# Patient Record
Sex: MALE | Race: OTHER
[De-identification: names, ages, dates, MRNs, and addresses within clinical notes are randomized per-mention and may not be internally consistent; named-entity substitution may affect disease eponyms.]

---

## 2017-02-04 ENCOUNTER — HOSPITAL ENCOUNTER (EMERGENCY)
Dept: HOSPITAL 47 - EC | Age: 72
Discharge: HOME | End: 2017-02-04
Payer: MEDICARE

## 2017-02-04 VITALS
DIASTOLIC BLOOD PRESSURE: 78 MMHG | SYSTOLIC BLOOD PRESSURE: 154 MMHG | HEART RATE: 74 BPM | RESPIRATION RATE: 16 BRPM | TEMPERATURE: 98.1 F

## 2017-02-04 DIAGNOSIS — Z79.84: ICD-10-CM

## 2017-02-04 DIAGNOSIS — F17.200: ICD-10-CM

## 2017-02-04 DIAGNOSIS — E11.65: Primary | ICD-10-CM

## 2017-02-04 LAB
ALP SERPL-CCNC: 76 U/L (ref 38–126)
ALT SERPL-CCNC: 53 U/L (ref 21–72)
ANION GAP SERPL CALC-SCNC: 14 MMOL/L
AST SERPL-CCNC: 28 U/L (ref 17–59)
BASOPHILS # BLD AUTO: 0.1 K/UL (ref 0–0.2)
BASOPHILS NFR BLD AUTO: 1 %
BUN SERPL-SCNC: 13 MG/DL (ref 9–20)
CALCIUM SPEC-MCNC: 9.5 MG/DL (ref 8.4–10.2)
CH: 30.3
CHCM: 33.6
CHLORIDE SERPL-SCNC: 104 MMOL/L (ref 98–107)
CO2 SERPL-SCNC: 23 MMOL/L (ref 22–30)
EOSINOPHIL # BLD AUTO: 0.2 K/UL (ref 0–0.7)
EOSINOPHIL NFR BLD AUTO: 2 %
ERYTHROCYTE [DISTWIDTH] IN BLOOD BY AUTOMATED COUNT: 4.83 M/UL (ref 4.3–5.9)
ERYTHROCYTE [DISTWIDTH] IN BLOOD: 13.6 % (ref 11.5–15.5)
GLUCOSE BLD-MCNC: 137 MG/DL (ref 75–99)
GLUCOSE BLD-MCNC: 193 MG/DL (ref 75–99)
GLUCOSE SERPL-MCNC: 200 MG/DL (ref 74–99)
GLUCOSE UR QL: (no result)
HCT VFR BLD AUTO: 43.7 % (ref 39–53)
HDW: 2.45
HGB BLD-MCNC: 14.4 GM/DL (ref 13–17.5)
KETONES UR QL STRIP.AUTO: (no result)
LUC NFR BLD AUTO: 3 %
LYMPHOCYTES # SPEC AUTO: 2 K/UL (ref 1–4.8)
LYMPHOCYTES NFR SPEC AUTO: 26 %
MAGNESIUM SPEC-SCNC: 1.6 MG/DL (ref 1.6–2.3)
MCH RBC QN AUTO: 29.9 PG (ref 25–35)
MCHC RBC AUTO-ENTMCNC: 33.1 G/DL (ref 31–37)
MCV RBC AUTO: 90.5 FL (ref 80–100)
MONOCYTES # BLD AUTO: 0.5 K/UL (ref 0–1)
MONOCYTES NFR BLD AUTO: 6 %
NEUTROPHILS # BLD AUTO: 4.9 K/UL (ref 1.3–7.7)
NEUTROPHILS NFR BLD AUTO: 63 %
NON-AFRICAN AMERICAN GFR(MDRD): >60
PH UR: 6 [PH] (ref 5–8)
PHOSPHATE SERPL-MCNC: 2.7 MG/DL (ref 2.5–4.5)
POTASSIUM SERPL-SCNC: 3.7 MMOL/L (ref 3.5–5.1)
PROT SERPL-MCNC: 7.2 G/DL (ref 6.3–8.2)
SODIUM SERPL-SCNC: 141 MMOL/L (ref 137–145)
SP GR UR: 1.03 (ref 1–1.03)
UA BILLING (MACRO VS. MICRO): (no result)
UROBILINOGEN UR QL STRIP: <2 MG/DL (ref ?–2)
WBC # BLD AUTO: 0.26 10*3/UL
WBC # BLD AUTO: 7.8 K/UL (ref 3.8–10.6)
WBC (PEROX): 7.6

## 2017-02-04 PROCEDURE — 82009 KETONE BODYS QUAL: CPT

## 2017-02-04 PROCEDURE — 84100 ASSAY OF PHOSPHORUS: CPT

## 2017-02-04 PROCEDURE — 87086 URINE CULTURE/COLONY COUNT: CPT

## 2017-02-04 PROCEDURE — 99284 EMERGENCY DEPT VISIT MOD MDM: CPT

## 2017-02-04 PROCEDURE — 36415 COLL VENOUS BLD VENIPUNCTURE: CPT

## 2017-02-04 PROCEDURE — 96360 HYDRATION IV INFUSION INIT: CPT

## 2017-02-04 PROCEDURE — 80053 COMPREHEN METABOLIC PANEL: CPT

## 2017-02-04 PROCEDURE — 81003 URINALYSIS AUTO W/O SCOPE: CPT

## 2017-02-04 PROCEDURE — 83735 ASSAY OF MAGNESIUM: CPT

## 2017-02-04 PROCEDURE — 85025 COMPLETE CBC W/AUTO DIFF WBC: CPT
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
## 2017-02-05 LAB — GLUCOSE BLD-MCNC: 223 MG/DL (ref 75–99)

## 2017-05-07 ENCOUNTER — HOSPITAL ENCOUNTER (EMERGENCY)
Dept: HOSPITAL 47 - EC | Age: 72
Discharge: HOME | End: 2017-05-07
Payer: MEDICARE

## 2017-05-07 VITALS — DIASTOLIC BLOOD PRESSURE: 76 MMHG | HEART RATE: 95 BPM | RESPIRATION RATE: 18 BRPM | SYSTOLIC BLOOD PRESSURE: 123 MMHG

## 2017-05-07 VITALS — TEMPERATURE: 98.3 F

## 2017-05-07 DIAGNOSIS — R63.8: ICD-10-CM

## 2017-05-07 DIAGNOSIS — E11.9: ICD-10-CM

## 2017-05-07 DIAGNOSIS — R51: ICD-10-CM

## 2017-05-07 DIAGNOSIS — Z79.84: ICD-10-CM

## 2017-05-07 DIAGNOSIS — Z90.49: ICD-10-CM

## 2017-05-07 DIAGNOSIS — F17.200: ICD-10-CM

## 2017-05-07 DIAGNOSIS — R10.9: Primary | ICD-10-CM

## 2017-05-07 LAB
ALP SERPL-CCNC: 107 U/L (ref 38–126)
ALT SERPL-CCNC: 50 U/L (ref 21–72)
AMYLASE SERPL-CCNC: 55 U/L (ref 30–110)
ANION GAP SERPL CALC-SCNC: 12 MMOL/L
AST SERPL-CCNC: 37 U/L (ref 17–59)
BASOPHILS # BLD AUTO: 0.1 K/UL (ref 0–0.2)
BASOPHILS NFR BLD AUTO: 1 %
BUN SERPL-SCNC: 9 MG/DL (ref 9–20)
CALCIUM SPEC-MCNC: 9.1 MG/DL (ref 8.4–10.2)
CH: 30.8
CHCM: 34.6
CHLORIDE SERPL-SCNC: 107 MMOL/L (ref 98–107)
CO2 SERPL-SCNC: 27 MMOL/L (ref 22–30)
EOSINOPHIL # BLD AUTO: 0.2 K/UL (ref 0–0.7)
EOSINOPHIL NFR BLD AUTO: 2 %
ERYTHROCYTE [DISTWIDTH] IN BLOOD BY AUTOMATED COUNT: 4.8 M/UL (ref 4.3–5.9)
ERYTHROCYTE [DISTWIDTH] IN BLOOD: 14.1 % (ref 11.5–15.5)
GLUCOSE SERPL-MCNC: 132 MG/DL (ref 74–99)
GLUCOSE UR QL: (no result)
HCT VFR BLD AUTO: 43 % (ref 39–53)
HDW: 2.96
HGB BLD-MCNC: 14.7 GM/DL (ref 13–17.5)
KETONES UR QL STRIP.AUTO: (no result)
LUC NFR BLD AUTO: 3 %
LYMPHOCYTES # SPEC AUTO: 2.3 K/UL (ref 1–4.8)
LYMPHOCYTES NFR SPEC AUTO: 27 %
MCH RBC QN AUTO: 30.7 PG (ref 25–35)
MCHC RBC AUTO-ENTMCNC: 34.3 G/DL (ref 31–37)
MCV RBC AUTO: 89.5 FL (ref 80–100)
MONOCYTES # BLD AUTO: 0.6 K/UL (ref 0–1)
MONOCYTES NFR BLD AUTO: 7 %
NEUTROPHILS # BLD AUTO: 5.3 K/UL (ref 1.3–7.7)
NEUTROPHILS NFR BLD AUTO: 60 %
NON-AFRICAN AMERICAN GFR(MDRD): >60
PH UR: 8.5 [PH] (ref 5–8)
POTASSIUM SERPL-SCNC: 3.5 MMOL/L (ref 3.5–5.1)
PROT SERPL-MCNC: 7.9 G/DL (ref 6.3–8.2)
SODIUM SERPL-SCNC: 146 MMOL/L (ref 137–145)
SP GR UR: 1.02 (ref 1–1.03)
UA BILLING (MACRO VS. MICRO): (no result)
UROBILINOGEN UR QL STRIP: <2 MG/DL (ref ?–2)
WBC # BLD AUTO: 0.27 10*3/UL
WBC # BLD AUTO: 8.7 K/UL (ref 3.8–10.6)
WBC (PEROX): 8.58

## 2017-05-07 PROCEDURE — 82150 ASSAY OF AMYLASE: CPT

## 2017-05-07 PROCEDURE — 96372 THER/PROPH/DIAG INJ SC/IM: CPT

## 2017-05-07 PROCEDURE — 80053 COMPREHEN METABOLIC PANEL: CPT

## 2017-05-07 PROCEDURE — 74000: CPT

## 2017-05-07 PROCEDURE — 81003 URINALYSIS AUTO W/O SCOPE: CPT

## 2017-05-07 PROCEDURE — 83690 ASSAY OF LIPASE: CPT

## 2017-05-07 PROCEDURE — 96375 TX/PRO/DX INJ NEW DRUG ADDON: CPT

## 2017-05-07 PROCEDURE — 99285 EMERGENCY DEPT VISIT HI MDM: CPT

## 2017-05-07 PROCEDURE — 96374 THER/PROPH/DIAG INJ IV PUSH: CPT

## 2017-05-07 PROCEDURE — 85025 COMPLETE CBC W/AUTO DIFF WBC: CPT

## 2017-05-07 PROCEDURE — 36415 COLL VENOUS BLD VENIPUNCTURE: CPT

## 2017-05-07 PROCEDURE — 74177 CT ABD & PELVIS W/CONTRAST: CPT

## 2017-05-07 NOTE — XR
EXAMINATION TYPE: XR KUB

 

DATE OF EXAM: 5/7/2017 8:59 AM

 

COMPARISON: 11/23/2016

 

HISTORY: Upper abdominal pain for 2 days with associated anorexia

 

TECHNIQUE: Upright single abdominal radiograph was obtained in the KUB projection.

 

FINDINGS: Cholecystectomy clips reside within the right upper quadrant. Surgical sutures are seen jaden
ng the left hemidiaphragm. There is a mild rotational dextroconvex curvature of the thoracolumbar spi
ne. Bowel gas pattern is unremarkable and nonobstructive. No abnormal radiopaque densities are seen w
ithin the abdomen or pelvis.

 

IMPRESSION: 

1. Nonobstructive bowel gas pattern.

2. No radiopaque calculi within the abdomen or pelvis.

3. Postsurgical changes along the left hemidiaphragm and within the gallbladder fossa.

## 2017-05-07 NOTE — CT
EXAMINATION TYPE: CT abdomen pelvis w con

 

DATE OF EXAM: 5/7/2017 10:45 AM

 

COMPARISON: 11/23/2016

 

HISTORY: Headache and Pain

 

CT DLP: 797.00 mGycm

Automated exposure control for dose reduction was used.

 

TECHNIQUE:  Helical acquisition of images was performed from the lung bases through the pelvis.

 

CONTRAST: 

Performed with Oral Contrast and with IV Contrast, patient injected with 100 ml mL of Omnipaque 300.

 

FINDINGS: 

 

LUNG BASES: Left hemidiaphragm elevation is similar to the prior with sutures along the left hemidiap
hragm and left basilar atelectasis. Pleural parenchymal scarring is also noted on the right as well a
s minimal right basilar atelectasis.

 

LIVER/GB: Diffuse hypoattenuation of the hepatic parenchyma is noted compatible with underlying hepat
ic steatosis. This finding limits evaluation for underlying hepatic masses. No gross evidence of hepa
tic masses seen. Gallbladder is surgically absent.

 

PANCREAS: No pancreatic ductal dilatation. Pancreas is abnormal enhancement and morphology.

 

SPLEEN: No significant abnormality is seen.

 

ADRENALS: No significant abnormality is seen.

 

KIDNEYS: The kidneys enhance and excrete symmetrically.

 

FREE AIR:  No free air is visualized.

 

RETROPERITONEAL ADENOPATHY:  None visualized

 

REPRODUCTIVE ORGANS: [place] gland is heterogenous and enlarged measuring up to 5.3 cm in greatest tr
ansverse dimension.

 

URINARY BLADDER:  No significant abnormality is seen.

 

PELVIC ADENOPATHY:  None visualized.

 

OSSEOUS STRUCTURES:  No significant abnormality is seen.

 

BOWEL:  Scattered sigmoid diverticula are seen without pericolonic fat stranding. Bowel is nondilated
 and nonobstructed. Appendix is within normal limits of size.

 

OTHER: Abdominal aorta is of normal course and caliber with calcified and noncalcified atheromatous c
hanges of the abdominal aorta and its branches.

 

IMPRESSION: 

 

1. OVERALL UNCHANGED EXAM IN COMPARISON TO THE PRIOR WITH NO ACUTE INTRA-ABDOMINAL PATHOLOGY IDENTIFI
ED.

2. HEPATIC STEATOSIS.

3. SIGMOID DIVERTICULOSIS WITHOUT EVIDENCE OF DIVERTICULITIS.

4. POSTSURGICAL CHANGES ALONG THE LEFT HEMIDIAPHRAGM WITH LEFT HEMIDIAPHRAGM ELEVATION AND LEFT BASIL
AR ATELECTASIS.
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
## 2017-07-26 ENCOUNTER — HOSPITAL ENCOUNTER (EMERGENCY)
Dept: HOSPITAL 47 - EC | Age: 72
Discharge: HOME | End: 2017-07-26
Payer: MEDICARE

## 2017-07-26 VITALS — HEART RATE: 69 BPM

## 2017-07-26 VITALS — SYSTOLIC BLOOD PRESSURE: 119 MMHG | RESPIRATION RATE: 17 BRPM | DIASTOLIC BLOOD PRESSURE: 71 MMHG

## 2017-07-26 VITALS — TEMPERATURE: 98 F

## 2017-07-26 DIAGNOSIS — F17.200: ICD-10-CM

## 2017-07-26 DIAGNOSIS — R42: ICD-10-CM

## 2017-07-26 DIAGNOSIS — E11.9: ICD-10-CM

## 2017-07-26 DIAGNOSIS — R53.1: Primary | ICD-10-CM

## 2017-07-26 DIAGNOSIS — R53.83: ICD-10-CM

## 2017-07-26 DIAGNOSIS — R41.82: ICD-10-CM

## 2017-07-26 DIAGNOSIS — Z79.899: ICD-10-CM

## 2017-07-26 DIAGNOSIS — Z79.84: ICD-10-CM

## 2017-07-26 DIAGNOSIS — R51: ICD-10-CM

## 2017-07-26 LAB
ALP SERPL-CCNC: 95 U/L (ref 38–126)
ALT SERPL-CCNC: 28 U/L (ref 21–72)
ANION GAP SERPL CALC-SCNC: 12 MMOL/L
APTT BLD: 24 SEC (ref 22–30)
AST SERPL-CCNC: 25 U/L (ref 17–59)
BASOPHILS # BLD AUTO: 0.1 K/UL (ref 0–0.2)
BASOPHILS NFR BLD AUTO: 1 %
BUN SERPL-SCNC: 6 MG/DL (ref 9–20)
CALCIUM SPEC-MCNC: 9.6 MG/DL (ref 8.4–10.2)
CH: 29.8
CHCM: 33.6
CHLORIDE SERPL-SCNC: 103 MMOL/L (ref 98–107)
CK SERPL-CCNC: 128 U/L (ref 55–170)
CO2 SERPL-SCNC: 24 MMOL/L (ref 22–30)
EOSINOPHIL # BLD AUTO: 0.2 K/UL (ref 0–0.7)
EOSINOPHIL NFR BLD AUTO: 2 %
ERYTHROCYTE [DISTWIDTH] IN BLOOD BY AUTOMATED COUNT: 5.03 M/UL (ref 4.3–5.9)
ERYTHROCYTE [DISTWIDTH] IN BLOOD: 14.8 % (ref 11.5–15.5)
GLUCOSE BLD-MCNC: 111 MG/DL (ref 75–99)
GLUCOSE SERPL-MCNC: 101 MG/DL (ref 74–99)
HCT VFR BLD AUTO: 44.8 % (ref 39–53)
HDW: 2.51
HGB BLD-MCNC: 14.8 GM/DL (ref 13–17.5)
INR PPP: 0.9 (ref ?–1.2)
LUC NFR BLD AUTO: 2 %
LYMPHOCYTES # SPEC AUTO: 2.5 K/UL (ref 1–4.8)
LYMPHOCYTES NFR SPEC AUTO: 29 %
MAGNESIUM SPEC-SCNC: 1.8 MG/DL (ref 1.6–2.3)
MCH RBC QN AUTO: 29.5 PG (ref 25–35)
MCHC RBC AUTO-ENTMCNC: 33.1 G/DL (ref 31–37)
MCV RBC AUTO: 89.1 FL (ref 80–100)
MONOCYTES # BLD AUTO: 0.5 K/UL (ref 0–1)
MONOCYTES NFR BLD AUTO: 6 %
NEUTROPHILS # BLD AUTO: 5.2 K/UL (ref 1.3–7.7)
NEUTROPHILS NFR BLD AUTO: 61 %
NON-AFRICAN AMERICAN GFR(MDRD): >60
PHOSPHATE SERPL-MCNC: 2.8 MG/DL (ref 2.5–4.5)
POTASSIUM SERPL-SCNC: 4.3 MMOL/L (ref 3.5–5.1)
PROT SERPL-MCNC: 7.8 G/DL (ref 6.3–8.2)
PT BLD: 9.7 SEC (ref 9–12)
SODIUM SERPL-SCNC: 139 MMOL/L (ref 137–145)
TROPONIN I SERPL-MCNC: <0.012 NG/ML (ref 0–0.03)
WBC # BLD AUTO: 0.18 10*3/UL
WBC # BLD AUTO: 8.6 K/UL (ref 3.8–10.6)
WBC (PEROX): 8.22

## 2017-07-26 PROCEDURE — 84100 ASSAY OF PHOSPHORUS: CPT

## 2017-07-26 PROCEDURE — 99284 EMERGENCY DEPT VISIT MOD MDM: CPT

## 2017-07-26 PROCEDURE — 71020: CPT

## 2017-07-26 PROCEDURE — 84484 ASSAY OF TROPONIN QUANT: CPT

## 2017-07-26 PROCEDURE — 36415 COLL VENOUS BLD VENIPUNCTURE: CPT

## 2017-07-26 PROCEDURE — 96361 HYDRATE IV INFUSION ADD-ON: CPT

## 2017-07-26 PROCEDURE — 85610 PROTHROMBIN TIME: CPT

## 2017-07-26 PROCEDURE — 96374 THER/PROPH/DIAG INJ IV PUSH: CPT

## 2017-07-26 PROCEDURE — 80053 COMPREHEN METABOLIC PANEL: CPT

## 2017-07-26 PROCEDURE — 82553 CREATINE MB FRACTION: CPT

## 2017-07-26 PROCEDURE — 83735 ASSAY OF MAGNESIUM: CPT

## 2017-07-26 PROCEDURE — 93005 ELECTROCARDIOGRAM TRACING: CPT

## 2017-07-26 PROCEDURE — 82550 ASSAY OF CK (CPK): CPT

## 2017-07-26 PROCEDURE — 85025 COMPLETE CBC W/AUTO DIFF WBC: CPT

## 2017-07-26 PROCEDURE — 85730 THROMBOPLASTIN TIME PARTIAL: CPT

## 2017-07-26 PROCEDURE — 70450 CT HEAD/BRAIN W/O DYE: CPT

## 2017-07-26 NOTE — XR
EXAMINATION TYPE: XR chest 2V

 

DATE OF EXAM: 7/26/2017

 

COMPARISON: 11/23/2016

 

HISTORY: Shortness of breath

 

TECHNIQUE:  Frontal and lateral views of the chest are obtained.

 

FINDINGS:

 

Scattered senescent parenchymal changes noted. Hyperinflation compatible with COPD. 

 

No evidence for infiltrate. No evidence for atelectasis. Left basilar parenchymal scarring. Chronic p
leural thickening bilateral costophrenic angles.

 

Heart size is stable.

 

Mediastinal structures are stable and grossly unremarkable.

 

No evidence for hilar prominence.

 

Degenerative changes dorsal spine. 

 

IMPRESSION:

1. No evidence for acute pulmonary disease.

## 2017-07-26 NOTE — CT
EXAMINATION TYPE: CT brain wo con

 

DATE OF EXAM: 7/26/2017

 

COMPARISON: Previous study dated 8/14/2016

 

HISTORY: Dizziness, HA, weakness

 

CT DLP: 1121 mGycm

Automated exposure control for dose reduction was used.

 

FINDINGS: 

There are generalized changes of sulcal prominence and ventriculomegaly, compatible with mild atrophi
c change. There is diffuse periventricular white matter lucency, compatible with small vessel ischemi
c change. There is no acute focal lesion, mass effect or midline shift identified. I do not see evide
nce of intracranial blood.

 

There is a 1.8 cm retention cyst or polyp involving the inferior aspect of the right maxillary sinus.
 There is mild, chronic mucoperiosteal thickening involving the left maxillary sinus. The remaining p
aranasal sinuses are clear. Fluid in the attic of both middle ears has cleared.

 

IMPRESSION: 

1. NO ACUTE INTRACRANIAL ABNORMALITY.

2. ATROPHIC CHANGE.

3. CHRONIC WHITE MATTER ISCHEMIC CHANGE.

4. MUCOPERIOSTEAL DISEASE INVOLVING BOTH MAXILLARY SINUSES.
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
## 2017-10-17 ENCOUNTER — HOSPITAL ENCOUNTER (EMERGENCY)
Dept: HOSPITAL 47 - EC | Age: 72
Discharge: HOME | End: 2017-10-17
Payer: MEDICARE

## 2017-10-17 VITALS — SYSTOLIC BLOOD PRESSURE: 147 MMHG | DIASTOLIC BLOOD PRESSURE: 78 MMHG | HEART RATE: 66 BPM

## 2017-10-17 VITALS — RESPIRATION RATE: 18 BRPM | TEMPERATURE: 98.7 F

## 2017-10-17 DIAGNOSIS — R11.0: ICD-10-CM

## 2017-10-17 DIAGNOSIS — Z79.899: ICD-10-CM

## 2017-10-17 DIAGNOSIS — E11.649: ICD-10-CM

## 2017-10-17 DIAGNOSIS — R51: Primary | ICD-10-CM

## 2017-10-17 DIAGNOSIS — I10: ICD-10-CM

## 2017-10-17 DIAGNOSIS — F17.200: ICD-10-CM

## 2017-10-17 DIAGNOSIS — R10.816: ICD-10-CM

## 2017-10-17 DIAGNOSIS — Z79.84: ICD-10-CM

## 2017-10-17 LAB
ALP SERPL-CCNC: 70 U/L (ref 38–126)
ALT SERPL-CCNC: 24 U/L (ref 21–72)
AMYLASE SERPL-CCNC: 49 U/L (ref 30–110)
ANION GAP SERPL CALC-SCNC: 10 MMOL/L
AST SERPL-CCNC: 15 U/L (ref 17–59)
BASOPHILS # BLD AUTO: 0.1 K/UL (ref 0–0.2)
BASOPHILS NFR BLD AUTO: 1 %
BUN SERPL-SCNC: 18 MG/DL (ref 9–20)
CALCIUM SPEC-MCNC: 9.3 MG/DL (ref 8.4–10.2)
CH: 29.9
CHCM: 32.7
CHLORIDE SERPL-SCNC: 110 MMOL/L (ref 98–107)
CO2 SERPL-SCNC: 22 MMOL/L (ref 22–30)
EOSINOPHIL # BLD AUTO: 0.2 K/UL (ref 0–0.7)
EOSINOPHIL NFR BLD AUTO: 3 %
ERYTHROCYTE [DISTWIDTH] IN BLOOD BY AUTOMATED COUNT: 4.6 M/UL (ref 4.3–5.9)
ERYTHROCYTE [DISTWIDTH] IN BLOOD: 14.5 % (ref 11.5–15.5)
GLUCOSE BLD-MCNC: 74 MG/DL (ref 75–99)
GLUCOSE SERPL-MCNC: 80 MG/DL (ref 74–99)
HCT VFR BLD AUTO: 42.3 % (ref 39–53)
HDW: 2.56
HGB BLD-MCNC: 13.7 GM/DL (ref 13–17.5)
INR PPP: 1 (ref ?–1.2)
LUC NFR BLD AUTO: 3 %
LYMPHOCYTES # SPEC AUTO: 2.3 K/UL (ref 1–4.8)
LYMPHOCYTES NFR SPEC AUTO: 25 %
MCH RBC QN AUTO: 29.7 PG (ref 25–35)
MCHC RBC AUTO-ENTMCNC: 32.3 G/DL (ref 31–37)
MCV RBC AUTO: 92 FL (ref 80–100)
MONOCYTES # BLD AUTO: 0.7 K/UL (ref 0–1)
MONOCYTES NFR BLD AUTO: 7 %
NEUTROPHILS # BLD AUTO: 5.4 K/UL (ref 1.3–7.7)
NEUTROPHILS NFR BLD AUTO: 61 %
NON-AFRICAN AMERICAN GFR(MDRD): >60
PH UR: 6 [PH] (ref 5–8)
POTASSIUM SERPL-SCNC: 3.6 MMOL/L (ref 3.5–5.1)
PROT SERPL-MCNC: 7.2 G/DL (ref 6.3–8.2)
PT BLD: 10.4 SEC (ref 9–12)
SODIUM SERPL-SCNC: 142 MMOL/L (ref 137–145)
SP GR UR: 1.02 (ref 1–1.03)
UA BILLING (MACRO VS. MICRO): (no result)
UROBILINOGEN UR QL STRIP: <2 MG/DL (ref ?–2)
WBC # BLD AUTO: 0.27 10*3/UL
WBC # BLD AUTO: 8.9 K/UL (ref 3.8–10.6)
WBC (PEROX): 8.78

## 2017-10-17 PROCEDURE — 74000: CPT

## 2017-10-17 PROCEDURE — 36415 COLL VENOUS BLD VENIPUNCTURE: CPT

## 2017-10-17 PROCEDURE — 70450 CT HEAD/BRAIN W/O DYE: CPT

## 2017-10-17 PROCEDURE — 80053 COMPREHEN METABOLIC PANEL: CPT

## 2017-10-17 PROCEDURE — 82150 ASSAY OF AMYLASE: CPT

## 2017-10-17 PROCEDURE — 96361 HYDRATE IV INFUSION ADD-ON: CPT

## 2017-10-17 PROCEDURE — 83690 ASSAY OF LIPASE: CPT

## 2017-10-17 PROCEDURE — 96375 TX/PRO/DX INJ NEW DRUG ADDON: CPT

## 2017-10-17 PROCEDURE — 85025 COMPLETE CBC W/AUTO DIFF WBC: CPT

## 2017-10-17 PROCEDURE — 93005 ELECTROCARDIOGRAM TRACING: CPT

## 2017-10-17 PROCEDURE — 85610 PROTHROMBIN TIME: CPT

## 2017-10-17 PROCEDURE — 96374 THER/PROPH/DIAG INJ IV PUSH: CPT

## 2017-10-17 PROCEDURE — 81003 URINALYSIS AUTO W/O SCOPE: CPT

## 2017-10-17 PROCEDURE — 99285 EMERGENCY DEPT VISIT HI MDM: CPT

## 2017-10-17 PROCEDURE — 84484 ASSAY OF TROPONIN QUANT: CPT

## 2017-10-17 PROCEDURE — 71020: CPT

## 2017-10-17 NOTE — XR
EXAMINATION TYPE: XR chest 2V

 

DATE OF EXAM: 10/17/2017

 

COMPARISON: Prior chest x-ray 7/26/2017

 

HISTORY: Headache and dizziness, nausea, COPD

 

TECHNIQUE:  Frontal and lateral views of the chest are obtained.

 

FINDINGS:  Cardiac mediastinal silhouette, pulmonary vascularity and celia are stable. There is some e
levation of the left hemidiaphragm which is chronic. Patchy basilar density is noted. Interstitium is
 increased. There are overlying cardiac leads. There are areas of possible scarring present. Suspect 
there are calcified pleural plaques. Hyperinflation may be indicative of COPD.

 

IMPRESSION:  No acute cardiopulmonary process. Possible asbestos related disease.
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
## 2017-10-17 NOTE — CT
EXAMINATION TYPE: CT brain wo con

 

DATE OF EXAM: 10/17/2017

 

COMPARISON: Prior CT 7/26/2017

 

HISTORY: Headache and dizziness. History of diabetes.

 

CT DLP: 1177.00 mGycm

Automated exposure control for dose reduction was used. Helical acquisition through the brain

 

FINDINGS: 

Minimal inflammatory change noted in the paranasal sinuses. Cerebral vascular calcifications are pres
ent. There is no hemorrhage or hydrocephalus. Cortical atrophy is noted. Periventricular white matter
 shows patchy low attenuation.

 

IMPRESSION: 

STABLE EXAM, NO ACUTE ABNORMALITY. AGE-RELATED CHANGES OF ATROPHY AND PROBABLE CHRONIC SMALL VESSEL I
SCHEMIA. MILD SINUS DISEASE.

## 2017-10-17 NOTE — XR
Abdomen

 

HISTORY: Dizziness, nausea

 

Frontal view of the abdomen on 2 images correlated to prior abdomen 5/7/2017

 

Calcified pleural plaque noted. There are basilar scarring changes. Surgical clips present in the rig
ht upper quadrant. There is a mild spinal curvature. No evident bowel obstruction or pneumoperitoneum
. There are vascular calcifications.

 

IMPRESSION: Nonobstructive bowel gas pattern.

## 2017-12-22 ENCOUNTER — HOSPITAL ENCOUNTER (EMERGENCY)
Dept: HOSPITAL 47 - EC | Age: 72
Discharge: HOME | End: 2017-12-22
Payer: MEDICARE

## 2017-12-22 DIAGNOSIS — F17.200: ICD-10-CM

## 2017-12-22 DIAGNOSIS — L29.9: Primary | ICD-10-CM

## 2017-12-22 PROCEDURE — 99282 EMERGENCY DEPT VISIT SF MDM: CPT

## 2018-03-10 ENCOUNTER — HOSPITAL ENCOUNTER (EMERGENCY)
Dept: HOSPITAL 47 - EC | Age: 73
Discharge: HOME | End: 2018-03-10
Payer: MEDICARE

## 2018-03-10 VITALS — SYSTOLIC BLOOD PRESSURE: 142 MMHG | DIASTOLIC BLOOD PRESSURE: 80 MMHG | TEMPERATURE: 98.7 F

## 2018-03-10 VITALS — RESPIRATION RATE: 18 BRPM

## 2018-03-10 VITALS — HEART RATE: 70 BPM

## 2018-03-10 DIAGNOSIS — Z79.84: ICD-10-CM

## 2018-03-10 DIAGNOSIS — I10: ICD-10-CM

## 2018-03-10 DIAGNOSIS — Z79.899: ICD-10-CM

## 2018-03-10 DIAGNOSIS — R51: ICD-10-CM

## 2018-03-10 DIAGNOSIS — F17.200: ICD-10-CM

## 2018-03-10 DIAGNOSIS — E11.9: ICD-10-CM

## 2018-03-10 DIAGNOSIS — E03.9: Primary | ICD-10-CM

## 2018-03-10 LAB
ALBUMIN SERPL-MCNC: 4.5 G/DL (ref 3.5–5)
ALP SERPL-CCNC: 88 U/L (ref 38–126)
ALT SERPL-CCNC: 21 U/L (ref 21–72)
ANION GAP SERPL CALC-SCNC: 13 MMOL/L
AST SERPL-CCNC: 26 U/L (ref 17–59)
BASOPHILS # BLD AUTO: 0.1 K/UL (ref 0–0.2)
BASOPHILS NFR BLD AUTO: 1 %
BUN SERPL-SCNC: 12 MG/DL (ref 9–20)
CALCIUM SPEC-MCNC: 9.8 MG/DL (ref 8.4–10.2)
CHLORIDE SERPL-SCNC: 105 MMOL/L (ref 98–107)
CO2 SERPL-SCNC: 20 MMOL/L (ref 22–30)
EOSINOPHIL # BLD AUTO: 0.2 K/UL (ref 0–0.7)
EOSINOPHIL NFR BLD AUTO: 2 %
ERYTHROCYTE [DISTWIDTH] IN BLOOD BY AUTOMATED COUNT: 4.65 M/UL (ref 4.3–5.9)
ERYTHROCYTE [DISTWIDTH] IN BLOOD: 14.2 % (ref 11.5–15.5)
GLUCOSE SERPL-MCNC: 141 MG/DL (ref 74–99)
HCT VFR BLD AUTO: 40.3 % (ref 39–53)
HGB BLD-MCNC: 13.7 GM/DL (ref 13–17.5)
LYMPHOCYTES # SPEC AUTO: 2.3 K/UL (ref 1–4.8)
LYMPHOCYTES NFR SPEC AUTO: 27 %
MCH RBC QN AUTO: 29.6 PG (ref 25–35)
MCHC RBC AUTO-ENTMCNC: 34.1 G/DL (ref 31–37)
MCV RBC AUTO: 86.8 FL (ref 80–100)
MONOCYTES # BLD AUTO: 0.5 K/UL (ref 0–1)
MONOCYTES NFR BLD AUTO: 6 %
NEUTROPHILS # BLD AUTO: 5.2 K/UL (ref 1.3–7.7)
NEUTROPHILS NFR BLD AUTO: 61 %
PLATELET # BLD AUTO: 331 K/UL (ref 150–450)
POTASSIUM SERPL-SCNC: 4.5 MMOL/L (ref 3.5–5.1)
PROT SERPL-MCNC: 7.9 G/DL (ref 6.3–8.2)
SODIUM SERPL-SCNC: 138 MMOL/L (ref 137–145)
T4 FREE SERPL-MCNC: 1.19 NG/DL (ref 0.78–2.19)
WBC # BLD AUTO: 8.5 K/UL (ref 3.8–10.6)

## 2018-03-10 PROCEDURE — 70450 CT HEAD/BRAIN W/O DYE: CPT

## 2018-03-10 PROCEDURE — 84443 ASSAY THYROID STIM HORMONE: CPT

## 2018-03-10 PROCEDURE — 36415 COLL VENOUS BLD VENIPUNCTURE: CPT

## 2018-03-10 PROCEDURE — 96361 HYDRATE IV INFUSION ADD-ON: CPT

## 2018-03-10 PROCEDURE — 80053 COMPREHEN METABOLIC PANEL: CPT

## 2018-03-10 PROCEDURE — 84439 ASSAY OF FREE THYROXINE: CPT

## 2018-03-10 PROCEDURE — 85025 COMPLETE CBC W/AUTO DIFF WBC: CPT

## 2018-03-10 PROCEDURE — 96374 THER/PROPH/DIAG INJ IV PUSH: CPT

## 2018-03-10 PROCEDURE — 99284 EMERGENCY DEPT VISIT MOD MDM: CPT

## 2018-03-10 NOTE — CT
EXAMINATION TYPE: CT brain wo con

 

DATE OF EXAM: 3/10/2018

 

HISTORY: Headache

 

CT DLP: 1051.6 mGycm.  Automated Exposure Control for Dose Reduction was Utilized.

 

TECHNIQUE: CT scan of the head is performed without contrast.

 

COMPARISON: CT brain October 17, 2017.

 

FINDINGS:   There is no acute intracranial hemorrhage or midline shift identified. There is diffuse v
entricular and sulcal prominence consistent with diffuse age-related cerebral atrophy.  There is low-
attenuation in the periventricular white matter consistent with chronic small vessel ischemic change.
  The globes  remain intact bilaterally.  There is persistent mild mucosal thickening and small polyp
 formation in bilateral ethmoid sinuses. Stable. There is chronic opacity and sclerosis of bilateral 
mastoid air cells redemonstrated

 

IMPRESSION:  No acute intracranial hemorrhage or midline shift.  There is persistent mild diffuse age
-related cerebral atrophy and moderate chronic small vessel ischemic change redemonstrated. No signif
icant change from prior CT.
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
## 2018-04-10 ENCOUNTER — HOSPITAL ENCOUNTER (OUTPATIENT)
Dept: HOSPITAL 47 - LABWHC1 | Age: 73
Discharge: HOME | End: 2018-04-10
Attending: PHYSICIAN ASSISTANT
Payer: MEDICARE

## 2018-04-10 DIAGNOSIS — L29.9: Primary | ICD-10-CM

## 2018-04-10 LAB
ALBUMIN SERPL-MCNC: 4.5 G/DL (ref 3.5–5)
ALP SERPL-CCNC: 94 U/L (ref 38–126)
ALT SERPL-CCNC: 33 U/L (ref 21–72)
ANION GAP SERPL CALC-SCNC: 16 MMOL/L
AST SERPL-CCNC: 21 U/L (ref 17–59)
BUN SERPL-SCNC: 10 MG/DL (ref 9–20)
CALCIUM SPEC-MCNC: 9.6 MG/DL (ref 8.4–10.2)
CHLORIDE SERPL-SCNC: 106 MMOL/L (ref 98–107)
CO2 SERPL-SCNC: 24 MMOL/L (ref 22–30)
ERYTHROCYTE [DISTWIDTH] IN BLOOD BY AUTOMATED COUNT: 4.56 M/UL (ref 4.3–5.9)
ERYTHROCYTE [DISTWIDTH] IN BLOOD: 14.2 % (ref 11.5–15.5)
GLUCOSE SERPL-MCNC: 122 MG/DL (ref 74–99)
HCT VFR BLD AUTO: 40.4 % (ref 39–53)
HGB BLD-MCNC: 13.7 GM/DL (ref 13–17.5)
MCH RBC QN AUTO: 30 PG (ref 25–35)
MCHC RBC AUTO-ENTMCNC: 33.8 G/DL (ref 31–37)
MCV RBC AUTO: 88.7 FL (ref 80–100)
PLATELET # BLD AUTO: 315 K/UL (ref 150–450)
POTASSIUM SERPL-SCNC: 4 MMOL/L (ref 3.5–5.1)
PROT SERPL-MCNC: 7.6 G/DL (ref 6.3–8.2)
SODIUM SERPL-SCNC: 146 MMOL/L (ref 137–145)
WBC # BLD AUTO: 9.3 K/UL (ref 3.8–10.6)

## 2018-04-10 PROCEDURE — 85027 COMPLETE CBC AUTOMATED: CPT

## 2018-04-10 PROCEDURE — 80053 COMPREHEN METABOLIC PANEL: CPT

## 2018-04-10 PROCEDURE — 36415 COLL VENOUS BLD VENIPUNCTURE: CPT

## 2018-05-15 ENCOUNTER — HOSPITAL ENCOUNTER (INPATIENT)
Dept: HOSPITAL 47 - EC | Age: 73
LOS: 5 days | Discharge: HOME | DRG: 177 | End: 2018-05-20
Payer: MEDICARE

## 2018-05-15 DIAGNOSIS — Z83.3: ICD-10-CM

## 2018-05-15 DIAGNOSIS — E11.65: ICD-10-CM

## 2018-05-15 DIAGNOSIS — Z79.84: ICD-10-CM

## 2018-05-15 DIAGNOSIS — T38.0X5A: ICD-10-CM

## 2018-05-15 DIAGNOSIS — J44.1: ICD-10-CM

## 2018-05-15 DIAGNOSIS — M47.9: ICD-10-CM

## 2018-05-15 DIAGNOSIS — E03.9: ICD-10-CM

## 2018-05-15 DIAGNOSIS — I11.9: ICD-10-CM

## 2018-05-15 DIAGNOSIS — M19.90: ICD-10-CM

## 2018-05-15 DIAGNOSIS — Z79.890: ICD-10-CM

## 2018-05-15 DIAGNOSIS — Z90.49: ICD-10-CM

## 2018-05-15 DIAGNOSIS — J15.6: Primary | ICD-10-CM

## 2018-05-15 DIAGNOSIS — J98.6: ICD-10-CM

## 2018-05-15 DIAGNOSIS — J44.0: ICD-10-CM

## 2018-05-15 DIAGNOSIS — E11.42: ICD-10-CM

## 2018-05-15 DIAGNOSIS — K21.9: ICD-10-CM

## 2018-05-15 DIAGNOSIS — Z90.2: ICD-10-CM

## 2018-05-15 DIAGNOSIS — Z79.899: ICD-10-CM

## 2018-05-15 DIAGNOSIS — K40.90: ICD-10-CM

## 2018-05-15 DIAGNOSIS — F17.210: ICD-10-CM
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
## 2018-05-15 DIAGNOSIS — J96.21: ICD-10-CM

## 2018-05-15 DIAGNOSIS — G89.29: ICD-10-CM

## 2018-05-15 DIAGNOSIS — M54.5: ICD-10-CM

## 2018-05-15 LAB
ALBUMIN SERPL-MCNC: 4 G/DL (ref 3.5–5)
ALP SERPL-CCNC: 88 U/L (ref 38–126)
ALT SERPL-CCNC: 31 U/L (ref 21–72)
ANION GAP SERPL CALC-SCNC: 13 MMOL/L
APTT BLD: 23.7 SEC (ref 22–30)
AST SERPL-CCNC: 18 U/L (ref 17–59)
BASOPHILS # BLD AUTO: 0 K/UL (ref 0–0.2)
BASOPHILS NFR BLD AUTO: 0 %
BUN SERPL-SCNC: 6 MG/DL (ref 9–20)
CALCIUM SPEC-MCNC: 9 MG/DL (ref 8.4–10.2)
CHLORIDE SERPL-SCNC: 104 MMOL/L (ref 98–107)
CK SERPL-CCNC: 118 U/L (ref 55–170)
CO2 SERPL-SCNC: 25 MMOL/L (ref 22–30)
D DIMER PPP FEU-MCNC: 0.49 MG/L FEU (ref ?–0.6)
EOSINOPHIL # BLD AUTO: 0.4 K/UL (ref 0–0.7)
EOSINOPHIL NFR BLD AUTO: 4 %
ERYTHROCYTE [DISTWIDTH] IN BLOOD BY AUTOMATED COUNT: 4.26 M/UL (ref 4.3–5.9)
ERYTHROCYTE [DISTWIDTH] IN BLOOD: 14.2 % (ref 11.5–15.5)
GLUCOSE BLD-MCNC: 148 MG/DL (ref 75–99)
GLUCOSE BLD-MCNC: 160 MG/DL (ref 75–99)
GLUCOSE BLD-MCNC: 198 MG/DL (ref 75–99)
GLUCOSE BLD-MCNC: 216 MG/DL (ref 75–99)
GLUCOSE BLD-MCNC: 237 MG/DL (ref 75–99)
GLUCOSE BLD-MCNC: 244 MG/DL (ref 75–99)
GLUCOSE BLD-MCNC: 312 MG/DL (ref 75–99)
GLUCOSE BLD-MCNC: 346 MG/DL (ref 75–99)
GLUCOSE SERPL-MCNC: 139 MG/DL (ref 74–99)
HBA1C MFR BLD: 7.4 % (ref 4–6)
HCT VFR BLD AUTO: 37.3 % (ref 39–53)
HGB BLD-MCNC: 12.3 GM/DL (ref 13–17.5)
INR PPP: 0.9 (ref ?–1.2)
LYMPHOCYTES # SPEC AUTO: 2.2 K/UL (ref 1–4.8)
LYMPHOCYTES NFR SPEC AUTO: 25 %
MAGNESIUM SPEC-SCNC: 1.9 MG/DL (ref 1.6–2.3)
MCH RBC QN AUTO: 29 PG (ref 25–35)
MCHC RBC AUTO-ENTMCNC: 33.1 G/DL (ref 31–37)
MCV RBC AUTO: 87.6 FL (ref 80–100)
MONOCYTES # BLD AUTO: 0.7 K/UL (ref 0–1)
MONOCYTES NFR BLD AUTO: 7 %
NEUTROPHILS # BLD AUTO: 5.6 K/UL (ref 1.3–7.7)
NEUTROPHILS NFR BLD AUTO: 62 %
PLATELET # BLD AUTO: 326 K/UL (ref 150–450)
POTASSIUM SERPL-SCNC: 3.8 MMOL/L (ref 3.5–5.1)
PROT SERPL-MCNC: 6.6 G/DL (ref 6.3–8.2)
PT BLD: 9.4 SEC (ref 9–12)
SODIUM SERPL-SCNC: 142 MMOL/L (ref 137–145)
TROPONIN I SERPL-MCNC: <0.012 NG/ML (ref 0–0.03)
WBC # BLD AUTO: 9.1 K/UL (ref 3.8–10.6)

## 2018-05-15 PROCEDURE — 85610 PROTHROMBIN TIME: CPT

## 2018-05-15 PROCEDURE — 80053 COMPREHEN METABOLIC PANEL: CPT

## 2018-05-15 PROCEDURE — 93005 ELECTROCARDIOGRAM TRACING: CPT

## 2018-05-15 PROCEDURE — 96375 TX/PRO/DX INJ NEW DRUG ADDON: CPT

## 2018-05-15 PROCEDURE — 84484 ASSAY OF TROPONIN QUANT: CPT

## 2018-05-15 PROCEDURE — 71046 X-RAY EXAM CHEST 2 VIEWS: CPT

## 2018-05-15 PROCEDURE — 82550 ASSAY OF CK (CPK): CPT

## 2018-05-15 PROCEDURE — 85379 FIBRIN DEGRADATION QUANT: CPT

## 2018-05-15 PROCEDURE — 82553 CREATINE MB FRACTION: CPT

## 2018-05-15 PROCEDURE — 94640 AIRWAY INHALATION TREATMENT: CPT

## 2018-05-15 PROCEDURE — 85730 THROMBOPLASTIN TIME PARTIAL: CPT

## 2018-05-15 PROCEDURE — 94760 N-INVAS EAR/PLS OXIMETRY 1: CPT

## 2018-05-15 PROCEDURE — 80048 BASIC METABOLIC PNL TOTAL CA: CPT

## 2018-05-15 PROCEDURE — 83036 HEMOGLOBIN GLYCOSYLATED A1C: CPT

## 2018-05-15 PROCEDURE — 83735 ASSAY OF MAGNESIUM: CPT

## 2018-05-15 PROCEDURE — 96374 THER/PROPH/DIAG INJ IV PUSH: CPT

## 2018-05-15 PROCEDURE — 71250 CT THORAX DX C-: CPT

## 2018-05-15 PROCEDURE — 85025 COMPLETE CBC W/AUTO DIFF WBC: CPT

## 2018-05-15 PROCEDURE — 36415 COLL VENOUS BLD VENIPUNCTURE: CPT

## 2018-05-15 PROCEDURE — 99285 EMERGENCY DEPT VISIT HI MDM: CPT

## 2018-05-15 RX ADMIN — MORPHINE SULFATE PRN MG: 10 SOLUTION ORAL at 16:26

## 2018-05-15 RX ADMIN — IPRATROPIUM BROMIDE AND ALBUTEROL SULFATE SCH ML: .5; 3 SOLUTION RESPIRATORY (INHALATION) at 11:42

## 2018-05-15 RX ADMIN — NICOTINE SCH: 14 PATCH, EXTENDED RELEASE TRANSDERMAL at 16:27

## 2018-05-15 RX ADMIN — LOSARTAN POTASSIUM SCH MG: 50 TABLET, FILM COATED ORAL at 12:54

## 2018-05-15 RX ADMIN — MORPHINE SULFATE PRN MG: 10 SOLUTION ORAL at 22:42

## 2018-05-15 RX ADMIN — LEVOTHYROXINE SODIUM SCH MCG: 25 TABLET ORAL at 12:54

## 2018-05-15 RX ADMIN — BUDESONIDE SCH: 1 SUSPENSION RESPIRATORY (INHALATION) at 16:37

## 2018-05-15 RX ADMIN — INSULIN HUMAN SCH MLS/HR: 100 INJECTION, SOLUTION PARENTERAL at 12:48

## 2018-05-15 RX ADMIN — BUDESONIDE SCH MG: 1 SUSPENSION RESPIRATORY (INHALATION) at 20:37

## 2018-05-15 RX ADMIN — METHYLPREDNISOLONE SODIUM SUCCINATE SCH MG: 40 INJECTION, POWDER, FOR SOLUTION INTRAMUSCULAR; INTRAVENOUS at 22:43

## 2018-05-15 RX ADMIN — IPRATROPIUM BROMIDE AND ALBUTEROL SULFATE SCH ML: .5; 3 SOLUTION RESPIRATORY (INHALATION) at 16:28

## 2018-05-15 RX ADMIN — ENOXAPARIN SODIUM SCH MG: 40 INJECTION SUBCUTANEOUS at 11:07

## 2018-05-15 RX ADMIN — INSULIN ASPART SCH UNIT: 100 INJECTION, SOLUTION INTRAVENOUS; SUBCUTANEOUS at 18:00

## 2018-05-15 RX ADMIN — INSULIN ASPART SCH UNIT: 100 INJECTION, SOLUTION INTRAVENOUS; SUBCUTANEOUS at 12:48

## 2018-05-15 RX ADMIN — HYDROCODONE BITARTRATE AND ACETAMINOPHEN PRN EACH: 10; 325 TABLET ORAL at 12:53

## 2018-05-15 RX ADMIN — IPRATROPIUM BROMIDE AND ALBUTEROL SULFATE SCH ML: .5; 3 SOLUTION RESPIRATORY (INHALATION) at 20:37

## 2018-05-15 NOTE — XR
EXAMINATION TYPE: XR chest 2V

 

DATE OF EXAM: 5/15/2018

 

COMPARISON: 10/17/2017

 

HISTORY: Difficulty breathing and cough

 

TECHNIQUE:  Frontal and lateral views of the chest are obtained.

 

FINDINGS:  There is pulmonary hyperinflation, biapical lucency and pleural parenchymal scarring versu
s chronic atelectasis at the lung bases. Minimal left hemidiaphragm elevation is again chronic. Surgi
clara sutures overlying the right medial clavicle are noted. Probable cholecystectomy clips are seen. D
egenerative changes of the glenohumeral joints and thoracic spine are mild.

 

IMPRESSION:  Chronic changes and sequela of COPD. No new acute cardiopulmonary process.
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
## 2018-05-15 NOTE — XR
EXAM:

  XR Chest, 2 Views

 

CLINICAL HISTORY:

  ITS.REASON XR Reason: Pain

 

TECHNIQUE:

  Frontal and lateral views of the chest.

 

COMPARISON:

  10/17/17.

 

FINDINGS:

  Lungs:  Redemonstration of chronic lung changes with prominent 

interstitial opacities and bibasilar scarring/atelectasis.

  Pleural space: Persistent elevated left hemidiaphragm.  No pneumothorax.

 

  Heart:  Stable cardiomediastinal silhouette.

  Mediastinum:  See above.

  Bones/joints: Stable.

 

IMPRESSION:     

  Chronic lung changes with mild basilar opacities, query 

scarring/atelectasis.  Mild superimposed acute process is difficult to 

completely exclude, recommend clinical correlation.
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
## 2018-05-16 LAB
GLUCOSE BLD-MCNC: 131 MG/DL (ref 75–99)
GLUCOSE BLD-MCNC: 154 MG/DL (ref 75–99)
GLUCOSE BLD-MCNC: 170 MG/DL (ref 75–99)
GLUCOSE BLD-MCNC: 183 MG/DL (ref 75–99)
GLUCOSE BLD-MCNC: 189 MG/DL (ref 75–99)
GLUCOSE BLD-MCNC: 195 MG/DL (ref 75–99)
GLUCOSE BLD-MCNC: 205 MG/DL (ref 75–99)
GLUCOSE BLD-MCNC: 209 MG/DL (ref 75–99)
GLUCOSE BLD-MCNC: 238 MG/DL (ref 75–99)
GLUCOSE BLD-MCNC: 252 MG/DL (ref 75–99)
GLUCOSE BLD-MCNC: 288 MG/DL (ref 75–99)
GLUCOSE BLD-MCNC: 315 MG/DL (ref 75–99)

## 2018-05-16 RX ADMIN — IPRATROPIUM BROMIDE AND ALBUTEROL SULFATE SCH ML: .5; 3 SOLUTION RESPIRATORY (INHALATION) at 12:22

## 2018-05-16 RX ADMIN — ENOXAPARIN SODIUM SCH MG: 40 INJECTION SUBCUTANEOUS at 07:56

## 2018-05-16 RX ADMIN — IPRATROPIUM BROMIDE AND ALBUTEROL SULFATE SCH ML: .5; 3 SOLUTION RESPIRATORY (INHALATION) at 19:12

## 2018-05-16 RX ADMIN — CEFTRIAXONE SODIUM SCH MG: 1 INJECTION, POWDER, FOR SOLUTION INTRAMUSCULAR; INTRAVENOUS at 07:56

## 2018-05-16 RX ADMIN — IPRATROPIUM BROMIDE AND ALBUTEROL SULFATE SCH ML: .5; 3 SOLUTION RESPIRATORY (INHALATION) at 23:55

## 2018-05-16 RX ADMIN — BUDESONIDE SCH MG: 1 SUSPENSION RESPIRATORY (INHALATION) at 19:12

## 2018-05-16 RX ADMIN — IPRATROPIUM BROMIDE AND ALBUTEROL SULFATE SCH ML: .5; 3 SOLUTION RESPIRATORY (INHALATION) at 00:24

## 2018-05-16 RX ADMIN — IPRATROPIUM BROMIDE AND ALBUTEROL SULFATE SCH: .5; 3 SOLUTION RESPIRATORY (INHALATION) at 00:22

## 2018-05-16 RX ADMIN — MORPHINE SULFATE PRN MG: 10 SOLUTION ORAL at 20:08

## 2018-05-16 RX ADMIN — IPRATROPIUM BROMIDE AND ALBUTEROL SULFATE SCH ML: .5; 3 SOLUTION RESPIRATORY (INHALATION) at 08:21

## 2018-05-16 RX ADMIN — INSULIN HUMAN SCH MLS/HR: 100 INJECTION, SOLUTION PARENTERAL at 07:02

## 2018-05-16 RX ADMIN — NICOTINE SCH: 14 PATCH, EXTENDED RELEASE TRANSDERMAL at 07:56

## 2018-05-16 RX ADMIN — IPRATROPIUM BROMIDE AND ALBUTEROL SULFATE SCH ML: .5; 3 SOLUTION RESPIRATORY (INHALATION) at 03:42

## 2018-05-16 RX ADMIN — IPRATROPIUM BROMIDE AND ALBUTEROL SULFATE SCH ML: .5; 3 SOLUTION RESPIRATORY (INHALATION) at 15:40

## 2018-05-16 RX ADMIN — LEVOTHYROXINE SODIUM SCH MCG: 25 TABLET ORAL at 06:08

## 2018-05-16 RX ADMIN — METHYLPREDNISOLONE SODIUM SUCCINATE SCH MG: 40 INJECTION, POWDER, FOR SOLUTION INTRAMUSCULAR; INTRAVENOUS at 07:56

## 2018-05-16 RX ADMIN — INSULIN ASPART SCH UNIT: 100 INJECTION, SOLUTION INTRAVENOUS; SUBCUTANEOUS at 17:53

## 2018-05-16 RX ADMIN — METHYLPREDNISOLONE SODIUM SUCCINATE SCH MG: 40 INJECTION, POWDER, FOR SOLUTION INTRAMUSCULAR; INTRAVENOUS at 15:09

## 2018-05-16 RX ADMIN — INSULIN ASPART SCH UNIT: 100 INJECTION, SOLUTION INTRAVENOUS; SUBCUTANEOUS at 12:33

## 2018-05-16 RX ADMIN — HYDROCODONE BITARTRATE AND ACETAMINOPHEN PRN EACH: 10; 325 TABLET ORAL at 15:21

## 2018-05-16 RX ADMIN — MORPHINE SULFATE PRN MG: 10 SOLUTION ORAL at 05:36

## 2018-05-16 RX ADMIN — INSULIN ASPART SCH UNIT: 100 INJECTION, SOLUTION INTRAVENOUS; SUBCUTANEOUS at 07:56

## 2018-05-16 RX ADMIN — BUDESONIDE SCH MG: 1 SUSPENSION RESPIRATORY (INHALATION) at 08:21

## 2018-05-16 RX ADMIN — METHYLPREDNISOLONE SODIUM SUCCINATE SCH MG: 40 INJECTION, POWDER, FOR SOLUTION INTRAMUSCULAR; INTRAVENOUS at 23:04

## 2018-05-16 RX ADMIN — INSULIN ASPART SCH UNIT: 100 INJECTION, SOLUTION INTRAVENOUS; SUBCUTANEOUS at 23:05

## 2018-05-16 RX ADMIN — INSULIN DETEMIR SCH UNIT: 100 INJECTION, SOLUTION SUBCUTANEOUS at 21:49

## 2018-05-16 RX ADMIN — LOSARTAN POTASSIUM SCH MG: 50 TABLET, FILM COATED ORAL at 07:57

## 2018-05-16 NOTE — CT
EXAMINATION TYPE: CT chest wo con

 

DATE OF EXAM: 5/16/2018

 

COMPARISON: CT chest April 22, 2010.

 

HISTORY: Shortness of breath and cough for 2 weeks.

 

CT DLP: 604 mGycm.  Automated Exposure Control for Dose Reduction was Utilized.

 

 

TECHNIQUE:  CT scan of the thorax is performed without IV contrast.

 

FINDINGS:

 

LUNGS: There is persistent elevated left hemidiaphragm. There is persistent bibasilar scarring and/or
 atelectasis. There is background mild to moderate emphysematous change most prominent in the lung ap
ices with mild to moderate biapical pleural/parenchymal scarring. There is stable 5 x 4 mm nodule axi
al image 30 unchanged from prior study axial image 25. No new consolidation or groundglass opacity is
 seen. No pleural effusion or pneumothorax is evident bilaterally. Calcification or less likely sutur
es are noted at level of left hemidiaphragm. Correlate clinically

 

MEDIASTINUM: Lack of IV contrast is noted to limit evaluation for mediastinal and especially hilar ad
enopathy. There are no definitive new  greater than 1 cm hilar or mediastinal lymph nodes. Subcarinal
 lymph node measures 1.8 x 1.1 cm on axial image 32 not significantly changed from prior study.  No c
ardiomegaly or pericardial effusion is seen. There is 4 vessel origin from aortic arch which is malik
l variant.

 

OTHER: Visualized liver is hypodense consistent with fatty infiltration. Cholecystectomy clips are re
demonstrated. Slight underlying scoliosis and upper thoracic spine is again seen. There is mild to mo
derate multilevel anterior and lateral spurring in the lower thoracic spine.

 

IMPRESSION: Chronic emphysematous change with bibasilar scarring and/or atelectasis and elevated left
 hemidiaphragm. No suspicious acute pulmonary process.
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
## 2018-05-16 NOTE — PN
PROGRESS NOTE



DATE OF SERVICE:

05/16/18.



PRESENTING COMPLAINT:

Short of breath,  wheezing.



INTERVAL HISTORY:

The patient is a smoker who presented with COPD exacerbation, cough, some little

sputum, up to the bathroom.  Feels a shade better.  Did tolerate a diet.  Did some

wheezing and still some wheezing and cough is present.



REVIEW OF SYSTEMS:

Done for constitutional, cardiovascular, GI, pulmonary and relevant findings as above.



CURRENT MEDICATIONS:

Reviewed that include IV Solu-Medrol, nebulized bronchodilator, Azithromycin,

ceftriaxone.



PHYSICAL EXAMINATION:

Temperature 98.7, pulse 100, respirations 17, blood pressure 132/86, pulse ox 91% on

room air. General appearance:  Sitting up, awake.  Eyes pupils are equal. Conjunctivae

normal.  HEENT external appearance of nose and ears normal.  Oral cavity normal.  Neck

JVD not raised.  Mass not palpable.  Respiratory effort increased.  Lungs decreased

breath sounds.  Some basal crackles.  Prolonged expiration.  Cardiovascular first and

second sounds normal.  No edema.

ABDOMEN:  Soft, nontender.  Liver and spleen not palpable.

PSYCHIATRY: Alert and oriented x3.  Mood and affect slightly anxious.



INVESTIGATIONS:

Accu-Cheks are noted.



ASSESSMENT:

1. Acute right lower lobe pneumonia suspect gram-negative organism, present on

    admission.

2. Acute chronic obstructive pulmonary disease exacerbation in a patient known smoker.

3. Chronic nicotine dependence patient is a cigarette smoker.

4. Diabetes mellitus type 2, uncontrolled, hypoglycemia, remains on insulin drip.

5. Essential hypertension.

6. Hypothyroidism.

7. Chronic low back pain from osteoarthritis.

8. Hyperglycemia, uncontrolled, steroids, patient requiring insulin drip.



PLAN:

Continue current medication and treatment plan. We will stop the insulin drip and use

some Lantus instead.





MMODL / IJN: [id number] / Job#: [id number]

## 2018-05-17 LAB
ANION GAP SERPL CALC-SCNC: 18 MMOL/L
BUN SERPL-SCNC: 14 MG/DL (ref 9–20)
CALCIUM SPEC-MCNC: 9.7 MG/DL (ref 8.4–10.2)
CHLORIDE SERPL-SCNC: 102 MMOL/L (ref 98–107)
CO2 SERPL-SCNC: 22 MMOL/L (ref 22–30)
GLUCOSE BLD-MCNC: 103 MG/DL (ref 75–99)
GLUCOSE BLD-MCNC: 152 MG/DL (ref 75–99)
GLUCOSE BLD-MCNC: 197 MG/DL (ref 75–99)
GLUCOSE BLD-MCNC: 205 MG/DL (ref 75–99)
GLUCOSE SERPL-MCNC: 209 MG/DL (ref 74–99)
POTASSIUM SERPL-SCNC: 3.9 MMOL/L (ref 3.5–5.1)
SODIUM SERPL-SCNC: 142 MMOL/L (ref 137–145)

## 2018-05-17 RX ADMIN — BUDESONIDE SCH MG: 1 SUSPENSION RESPIRATORY (INHALATION) at 19:31

## 2018-05-17 RX ADMIN — IPRATROPIUM BROMIDE AND ALBUTEROL SULFATE SCH ML: .5; 3 SOLUTION RESPIRATORY (INHALATION) at 15:45

## 2018-05-17 RX ADMIN — NICOTINE SCH PATCH: 14 PATCH, EXTENDED RELEASE TRANSDERMAL at 17:33

## 2018-05-17 RX ADMIN — INSULIN ASPART SCH UNIT: 100 INJECTION, SOLUTION INTRAVENOUS; SUBCUTANEOUS at 17:34

## 2018-05-17 RX ADMIN — IPRATROPIUM BROMIDE AND ALBUTEROL SULFATE SCH: .5; 3 SOLUTION RESPIRATORY (INHALATION) at 08:59

## 2018-05-17 RX ADMIN — LEVOTHYROXINE SODIUM SCH MCG: 25 TABLET ORAL at 06:29

## 2018-05-17 RX ADMIN — METHYLPREDNISOLONE SODIUM SUCCINATE SCH MG: 40 INJECTION, POWDER, FOR SOLUTION INTRAMUSCULAR; INTRAVENOUS at 23:01

## 2018-05-17 RX ADMIN — BUDESONIDE SCH: 1 SUSPENSION RESPIRATORY (INHALATION) at 08:59

## 2018-05-17 RX ADMIN — HYDROCODONE BITARTRATE AND ACETAMINOPHEN PRN EACH: 10; 325 TABLET ORAL at 02:05

## 2018-05-17 RX ADMIN — IPRATROPIUM BROMIDE AND ALBUTEROL SULFATE SCH ML: .5; 3 SOLUTION RESPIRATORY (INHALATION) at 19:31

## 2018-05-17 RX ADMIN — HYDROCODONE BITARTRATE AND ACETAMINOPHEN PRN EACH: 10; 325 TABLET ORAL at 10:37

## 2018-05-17 RX ADMIN — INSULIN ASPART SCH UNIT: 100 INJECTION, SOLUTION INTRAVENOUS; SUBCUTANEOUS at 17:33

## 2018-05-17 RX ADMIN — INSULIN DETEMIR SCH UNIT: 100 INJECTION, SOLUTION SUBCUTANEOUS at 21:06

## 2018-05-17 RX ADMIN — MORPHINE SULFATE PRN MG: 10 SOLUTION ORAL at 17:33

## 2018-05-17 RX ADMIN — ENOXAPARIN SODIUM SCH MG: 40 INJECTION SUBCUTANEOUS at 07:56

## 2018-05-17 RX ADMIN — IPRATROPIUM BROMIDE AND ALBUTEROL SULFATE SCH: .5; 3 SOLUTION RESPIRATORY (INHALATION) at 23:48

## 2018-05-17 RX ADMIN — INSULIN ASPART SCH: 100 INJECTION, SOLUTION INTRAVENOUS; SUBCUTANEOUS at 20:40

## 2018-05-17 RX ADMIN — METHYLPREDNISOLONE SODIUM SUCCINATE SCH MG: 40 INJECTION, POWDER, FOR SOLUTION INTRAMUSCULAR; INTRAVENOUS at 07:55

## 2018-05-17 RX ADMIN — IPRATROPIUM BROMIDE AND ALBUTEROL SULFATE SCH ML: .5; 3 SOLUTION RESPIRATORY (INHALATION) at 11:57

## 2018-05-17 RX ADMIN — INSULIN ASPART SCH UNIT: 100 INJECTION, SOLUTION INTRAVENOUS; SUBCUTANEOUS at 12:34

## 2018-05-17 RX ADMIN — CEFTRIAXONE SODIUM SCH MG: 1 INJECTION, POWDER, FOR SOLUTION INTRAMUSCULAR; INTRAVENOUS at 07:55

## 2018-05-17 RX ADMIN — IPRATROPIUM BROMIDE AND ALBUTEROL SULFATE SCH ML: .5; 3 SOLUTION RESPIRATORY (INHALATION) at 03:45

## 2018-05-17 RX ADMIN — INSULIN ASPART SCH UNIT: 100 INJECTION, SOLUTION INTRAVENOUS; SUBCUTANEOUS at 07:56

## 2018-05-17 RX ADMIN — AZITHROMYCIN SCH MG: 500 TABLET, FILM COATED ORAL at 07:56

## 2018-05-17 RX ADMIN — METHYLPREDNISOLONE SODIUM SUCCINATE SCH MG: 40 INJECTION, POWDER, FOR SOLUTION INTRAMUSCULAR; INTRAVENOUS at 17:33

## 2018-05-17 RX ADMIN — LOSARTAN POTASSIUM SCH MG: 50 TABLET, FILM COATED ORAL at 07:56

## 2018-05-18 LAB
ANION GAP SERPL CALC-SCNC: 16 MMOL/L
BUN SERPL-SCNC: 20 MG/DL (ref 9–20)
CALCIUM SPEC-MCNC: 9.7 MG/DL (ref 8.4–10.2)
CHLORIDE SERPL-SCNC: 99 MMOL/L (ref 98–107)
CO2 SERPL-SCNC: 25 MMOL/L (ref 22–30)
GLUCOSE BLD-MCNC: 171 MG/DL (ref 75–99)
GLUCOSE BLD-MCNC: 199 MG/DL (ref 75–99)
GLUCOSE BLD-MCNC: 232 MG/DL (ref 75–99)
GLUCOSE BLD-MCNC: 279 MG/DL (ref 75–99)
GLUCOSE SERPL-MCNC: 207 MG/DL (ref 74–99)
POTASSIUM SERPL-SCNC: 4.7 MMOL/L (ref 3.5–5.1)
SODIUM SERPL-SCNC: 140 MMOL/L (ref 137–145)

## 2018-05-18 RX ADMIN — INSULIN ASPART SCH UNIT: 100 INJECTION, SOLUTION INTRAVENOUS; SUBCUTANEOUS at 08:09

## 2018-05-18 RX ADMIN — INSULIN DETEMIR SCH UNIT: 100 INJECTION, SOLUTION SUBCUTANEOUS at 20:47

## 2018-05-18 RX ADMIN — LEVOTHYROXINE SODIUM SCH MCG: 25 TABLET ORAL at 06:43

## 2018-05-18 RX ADMIN — IPRATROPIUM BROMIDE AND ALBUTEROL SULFATE SCH ML: .5; 3 SOLUTION RESPIRATORY (INHALATION) at 15:24

## 2018-05-18 RX ADMIN — METHYLPREDNISOLONE SODIUM SUCCINATE SCH MG: 40 INJECTION, POWDER, FOR SOLUTION INTRAMUSCULAR; INTRAVENOUS at 08:09

## 2018-05-18 RX ADMIN — MORPHINE SULFATE PRN MG: 10 SOLUTION ORAL at 11:12

## 2018-05-18 RX ADMIN — HYDROCODONE BITARTRATE AND ACETAMINOPHEN PRN EACH: 10; 325 TABLET ORAL at 07:16

## 2018-05-18 RX ADMIN — BUDESONIDE SCH MG: 1 SUSPENSION RESPIRATORY (INHALATION) at 07:28

## 2018-05-18 RX ADMIN — METHYLPREDNISOLONE SODIUM SUCCINATE SCH MG: 40 INJECTION, POWDER, FOR SOLUTION INTRAMUSCULAR; INTRAVENOUS at 20:40

## 2018-05-18 RX ADMIN — AZITHROMYCIN SCH MG: 500 TABLET, FILM COATED ORAL at 08:10

## 2018-05-18 RX ADMIN — IPRATROPIUM BROMIDE AND ALBUTEROL SULFATE SCH ML: .5; 3 SOLUTION RESPIRATORY (INHALATION) at 20:10

## 2018-05-18 RX ADMIN — INSULIN ASPART SCH UNIT: 100 INJECTION, SOLUTION INTRAVENOUS; SUBCUTANEOUS at 17:35

## 2018-05-18 RX ADMIN — IPRATROPIUM BROMIDE AND ALBUTEROL SULFATE SCH ML: .5; 3 SOLUTION RESPIRATORY (INHALATION) at 23:55

## 2018-05-18 RX ADMIN — INSULIN ASPART SCH UNIT: 100 INJECTION, SOLUTION INTRAVENOUS; SUBCUTANEOUS at 12:56

## 2018-05-18 RX ADMIN — NICOTINE SCH PATCH: 14 PATCH, EXTENDED RELEASE TRANSDERMAL at 08:25

## 2018-05-18 RX ADMIN — CEFTRIAXONE SODIUM SCH MG: 1 INJECTION, POWDER, FOR SOLUTION INTRAMUSCULAR; INTRAVENOUS at 08:09

## 2018-05-18 RX ADMIN — HYDROCODONE BITARTRATE AND ACETAMINOPHEN PRN EACH: 10; 325 TABLET ORAL at 22:32

## 2018-05-18 RX ADMIN — BUDESONIDE SCH MG: 1 SUSPENSION RESPIRATORY (INHALATION) at 20:10

## 2018-05-18 RX ADMIN — METHYLPREDNISOLONE SODIUM SUCCINATE SCH MG: 40 INJECTION, POWDER, FOR SOLUTION INTRAMUSCULAR; INTRAVENOUS at 16:12

## 2018-05-18 RX ADMIN — IPRATROPIUM BROMIDE AND ALBUTEROL SULFATE SCH ML: .5; 3 SOLUTION RESPIRATORY (INHALATION) at 11:11

## 2018-05-18 RX ADMIN — INSULIN ASPART SCH UNIT: 100 INJECTION, SOLUTION INTRAVENOUS; SUBCUTANEOUS at 20:47

## 2018-05-18 RX ADMIN — IPRATROPIUM BROMIDE AND ALBUTEROL SULFATE SCH ML: .5; 3 SOLUTION RESPIRATORY (INHALATION) at 07:28

## 2018-05-18 RX ADMIN — IPRATROPIUM BROMIDE AND ALBUTEROL SULFATE SCH: .5; 3 SOLUTION RESPIRATORY (INHALATION) at 02:56

## 2018-05-18 RX ADMIN — MORPHINE SULFATE PRN MG: 10 SOLUTION ORAL at 18:16

## 2018-05-18 RX ADMIN — CEFUROXIME AXETIL SCH MG: 250 TABLET ORAL at 20:40

## 2018-05-18 RX ADMIN — LOSARTAN POTASSIUM SCH MG: 50 TABLET, FILM COATED ORAL at 08:10

## 2018-05-18 RX ADMIN — ENOXAPARIN SODIUM SCH MG: 40 INJECTION SUBCUTANEOUS at 08:10
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
## 2018-05-18 NOTE — PN
PROGRESS NOTE



DATE OF SERVICE:

May 17, 2018.



PRESENTING COMPLAINT:

Short of breath.



INTERVAL HISTORY:

Patient is a smoker, presented with COPD exacerbation.  Still this sputum is coming up.

Still wheezing,  feeling a shade better.  Diet is improving.  He has been up to the

bathroom a couple of times.



REVIEW OF SYSTEMS:

Done for constitutional, cardiovascular, GI,  pulmonary findings as above.



CURRENT MEDICATIONS:

Reviewed that include Zithromax and ceftriaxone.



PHYSICAL EXAMINATION:

Temperature 97.2, pulse 72, respiratory 18, blood pressure fluctuating, will get a 2nd

reading.  Last reading was 198/88, though also reading with 140 present pulse ox 98% on

room air. General appearance:  Sitting up in bed, awake.  Eyes: Pupils equal,

conjunctivae normal.  HEENT external appearance of nose and ears normal. Oral cavity

normal.  Neck JVD not raised.  Mass not palpable.  Respiratory effort increased.  Lungs

decreased breath sounds.  Prolonged expiration and wheezing.  Cardiovascular:  1st and

2nd sounds normal.  No edema.

ABDOMEN:  Soft, nontender.  Liver and spleen not palpable.  Psychiatry:  Alert and

oriented x3. Mood and affect normal.



INVESTIGATIONS:

CT scan of chest shows elevated left diaphragm.  Potassium 3.9.  Hematocrit is normal.

Accu-Cheks are noted.



ASSESSMENT:

1. Pneumonia suspect gram-negative organism, slowly improving.

2. Acute chronic obstructive pulmonary disease exacerbation in a patient with known

    smoker.

3. Chronic nicotine dependence in a cigarette smoker.

4. Diabetes mellitus type 2 uncontrolled.

5. Hyperglycemia.  Remains on insulin drip.

6. Essential hypertension.

7. Hypothyroidism.

8. Chronic low back pain from osteoarthritis.

9. Chronically elevated left diaphragm.



PLAN:

Continue medication and treatment plan.  Care was discussed with the patient.

Encouraged to ambulate.





MMODL / IJN: [id number] / Job#: [id number]

## 2018-05-18 NOTE — PN
PROGRESS NOTE



DATE OF SERVICE:

05/18/2018



PRESENTING COMPLAINT:

Short of breath.



INTERVAL HISTORY:

Patient with a COPD exacerbation.  Breathing is getting better, still short of breath

when he gets about.  Overall doing better, tolerating his diet well, being followed by

Pulmonary.



REVIEW OF SYSTEMS:

Done for constitutional, cardiovascular, GI, pulmonary; relevant findings as above.



CURRENT MEDICATIONS:

Reviewed that include IV Solu-Medrol.



EXAMINATION:

Temperature 98.3, pulse 92, respirations 18, blood pressure 162/95 pulse ox 98% on 2L.

GENERAL APPEARANCE:  Sitting up, not in distress.

EYES:  Pupils equal.  Conjunctivae normal.

HEENT:  External nose and ears normal.  Oral cavity normal.

NECK:  JVD not raised.  Mass not palpable.

RESPIRATORY:  Effort increased.

LUNGS:  Decreased breath sounds.  Prolonged expiration, wheezing.  CARDIOVASCULAR:

First and second sounds normal.  No edema.

ABDOMEN:  Soft, nontender.  Liver and spleen not palpable.

PSYCHIATRY:  Alert and oriented x3.  Mood and affect anxious-appearing.



INVESTIGATIONS:

Potassium 4.7.  Accu-Cheks are noted.



ASSESSMENT:

1. Pneumonia, suspect gram-negative organism, improving.

2. Acute chronic obstructive pulmonary disease exacerbation in a patient with known

    smoker, slow to respond.

3. Chronic nicotine dependence in a cigarette smoker.

4. Diabetes mellitus type 2, uncontrolled with hyperglycemia.  The patient has been on

    insulin drip.

5. Essential hypertension.

6. Hypothyroidism.

7. Chronic low back pain from osteoarthritis.

8. Chronically elevated left diaphragm.

The patient is off the insulin drip.  Continue current medication and treatment plan.

Since the patient is afebrile, will switch over to oral antibiotic.





MMODL / IJN: [id number] / Job#: [id number]
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
## 2018-05-19 LAB
ANION GAP SERPL CALC-SCNC: 15 MMOL/L
BUN SERPL-SCNC: 21 MG/DL (ref 9–20)
CALCIUM SPEC-MCNC: 9.9 MG/DL (ref 8.4–10.2)
CHLORIDE SERPL-SCNC: 98 MMOL/L (ref 98–107)
CO2 SERPL-SCNC: 23 MMOL/L (ref 22–30)
GLUCOSE BLD-MCNC: 116 MG/DL (ref 75–99)
GLUCOSE BLD-MCNC: 140 MG/DL (ref 75–99)
GLUCOSE BLD-MCNC: 262 MG/DL (ref 75–99)
GLUCOSE BLD-MCNC: 264 MG/DL (ref 75–99)
GLUCOSE SERPL-MCNC: 234 MG/DL (ref 74–99)
POTASSIUM SERPL-SCNC: 4.4 MMOL/L (ref 3.5–5.1)
SODIUM SERPL-SCNC: 136 MMOL/L (ref 137–145)

## 2018-05-19 RX ADMIN — METHYLPREDNISOLONE SODIUM SUCCINATE SCH MG: 40 INJECTION, POWDER, FOR SOLUTION INTRAMUSCULAR; INTRAVENOUS at 08:06

## 2018-05-19 RX ADMIN — ENOXAPARIN SODIUM SCH MG: 40 INJECTION SUBCUTANEOUS at 08:06

## 2018-05-19 RX ADMIN — INSULIN ASPART SCH: 100 INJECTION, SOLUTION INTRAVENOUS; SUBCUTANEOUS at 21:44

## 2018-05-19 RX ADMIN — INSULIN ASPART SCH UNIT: 100 INJECTION, SOLUTION INTRAVENOUS; SUBCUTANEOUS at 17:41

## 2018-05-19 RX ADMIN — INSULIN DETEMIR SCH UNIT: 100 INJECTION, SOLUTION SUBCUTANEOUS at 21:47

## 2018-05-19 RX ADMIN — INSULIN ASPART SCH UNIT: 100 INJECTION, SOLUTION INTRAVENOUS; SUBCUTANEOUS at 12:35

## 2018-05-19 RX ADMIN — LOSARTAN POTASSIUM SCH MG: 50 TABLET, FILM COATED ORAL at 08:07

## 2018-05-19 RX ADMIN — CEFUROXIME AXETIL SCH MG: 250 TABLET ORAL at 21:47

## 2018-05-19 RX ADMIN — INSULIN ASPART SCH UNIT: 100 INJECTION, SOLUTION INTRAVENOUS; SUBCUTANEOUS at 08:05

## 2018-05-19 RX ADMIN — NICOTINE SCH PATCH: 14 PATCH, EXTENDED RELEASE TRANSDERMAL at 08:08

## 2018-05-19 RX ADMIN — INSULIN ASPART SCH UNIT: 100 INJECTION, SOLUTION INTRAVENOUS; SUBCUTANEOUS at 12:36

## 2018-05-19 RX ADMIN — IPRATROPIUM BROMIDE AND ALBUTEROL SULFATE SCH ML: .5; 3 SOLUTION RESPIRATORY (INHALATION) at 08:43

## 2018-05-19 RX ADMIN — AZITHROMYCIN SCH MG: 500 TABLET, FILM COATED ORAL at 08:07

## 2018-05-19 RX ADMIN — BUDESONIDE SCH MG: 1 SUSPENSION RESPIRATORY (INHALATION) at 08:43

## 2018-05-19 RX ADMIN — BUDESONIDE SCH MG: 1 SUSPENSION RESPIRATORY (INHALATION) at 20:32

## 2018-05-19 RX ADMIN — INSULIN ASPART SCH UNIT: 100 INJECTION, SOLUTION INTRAVENOUS; SUBCUTANEOUS at 17:40

## 2018-05-19 RX ADMIN — LEVOTHYROXINE SODIUM SCH MCG: 25 TABLET ORAL at 06:13

## 2018-05-19 RX ADMIN — IPRATROPIUM BROMIDE AND ALBUTEROL SULFATE SCH ML: .5; 3 SOLUTION RESPIRATORY (INHALATION) at 20:32

## 2018-05-19 RX ADMIN — IPRATROPIUM BROMIDE AND ALBUTEROL SULFATE SCH ML: .5; 3 SOLUTION RESPIRATORY (INHALATION) at 12:13

## 2018-05-19 RX ADMIN — HYDROCODONE BITARTRATE AND ACETAMINOPHEN PRN EACH: 10; 325 TABLET ORAL at 18:22

## 2018-05-19 RX ADMIN — IPRATROPIUM BROMIDE AND ALBUTEROL SULFATE SCH ML: .5; 3 SOLUTION RESPIRATORY (INHALATION) at 16:04

## 2018-05-19 RX ADMIN — IPRATROPIUM BROMIDE AND ALBUTEROL SULFATE SCH ML: .5; 3 SOLUTION RESPIRATORY (INHALATION) at 03:39

## 2018-05-19 RX ADMIN — MORPHINE SULFATE PRN MG: 10 SOLUTION ORAL at 11:13

## 2018-05-19 RX ADMIN — CEFUROXIME AXETIL SCH MG: 250 TABLET ORAL at 08:06
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
## 2018-05-19 NOTE — PN
PROGRESS NOTE



DATE OF SERVICE:

May 19, 2018.



PRESENT COMPLAINT:

Short of breath.



INTERVAL HISTORY:

The patient presented with COPD exacerbation.  Cough is getting better.  Sputum

production is minimal.  Appetite is getting better.  Up and about from the bathroom.



REVIEW OF SYSTEMS:

Done for constitutional, cardiovascular, GI, pulmonary; relevant findings as above.



CURRENT MEDICATIONS:

Reviewed that include IV Solu-Medrol, bronchodilators, Zithromax, Ceftin.



PHYSICAL EXAMINATION:

Temperature 99.2, pulse 93, respiration 16, blood pressure 127/78, pulse ox 98% on room

air. General appearance:  Sitting up in a chair, more comfortable.  Eyes pupils are

equal. Conjunctivae normal.  HEENT: External appearance of nose and ears normal. Oral

cavity normal.  Neck JVD not raised.  Mass not palpable.  Respiratory effort increased.

Lungs decreased breath sounds.  Decreased wheezing.  Cardiovascular 1st and second

sounds normal.  No edema.

ABDOMEN:  Soft, nontender.  Liver and spleen not palpable.  Psychiatry:  Alert and

oriented x3.  Mood and affect normal.



INVESTIGATIONS:

Potassium 4.4. Accu-Cheks are noted.



ASSESSMENT:

1. Pneumonia suspect gram-negative organism. Continues to improve.

2. Acute chronic obstructive pulmonary disease exacerbation in a no smoker getting

    better.

3. Chronic nicotine dependence in a cigarette smoker.

4. Diabetes mellitus type 2, uncontrolled with hypoglycemia.

5. Essential hypertension.

6. Hypothyroidism.

7. Chronic low back pain from osteoarthritis.

8. Chronically elevated left diaphragm.



PLAN:

Overall doing much better.  Patient was switched over to oral steroids.  Care was

discussed with the patient.  Looking for possible discharge tomorrow.





MMODL / IJN: [id number] / Job#: [id number]

## 2018-05-20 VITALS — RESPIRATION RATE: 16 BRPM | TEMPERATURE: 97.4 F | DIASTOLIC BLOOD PRESSURE: 82 MMHG | SYSTOLIC BLOOD PRESSURE: 134 MMHG

## 2018-05-20 VITALS — HEART RATE: 96 BPM

## 2018-05-20 LAB
ANION GAP SERPL CALC-SCNC: 17 MMOL/L
BUN SERPL-SCNC: 25 MG/DL (ref 9–20)
CALCIUM SPEC-MCNC: 10.1 MG/DL (ref 8.4–10.2)
CHLORIDE SERPL-SCNC: 99 MMOL/L (ref 98–107)
CO2 SERPL-SCNC: 22 MMOL/L (ref 22–30)
GLUCOSE BLD-MCNC: 182 MG/DL (ref 75–99)
GLUCOSE BLD-MCNC: 222 MG/DL (ref 75–99)
GLUCOSE SERPL-MCNC: 221 MG/DL (ref 74–99)
POTASSIUM SERPL-SCNC: 4.9 MMOL/L (ref 3.5–5.1)
SODIUM SERPL-SCNC: 138 MMOL/L (ref 137–145)

## 2018-05-20 RX ADMIN — BUDESONIDE SCH MG: 1 SUSPENSION RESPIRATORY (INHALATION) at 08:01

## 2018-05-20 RX ADMIN — IPRATROPIUM BROMIDE AND ALBUTEROL SULFATE SCH ML: .5; 3 SOLUTION RESPIRATORY (INHALATION) at 11:43

## 2018-05-20 RX ADMIN — IPRATROPIUM BROMIDE AND ALBUTEROL SULFATE SCH ML: .5; 3 SOLUTION RESPIRATORY (INHALATION) at 03:38

## 2018-05-20 RX ADMIN — IPRATROPIUM BROMIDE AND ALBUTEROL SULFATE SCH ML: .5; 3 SOLUTION RESPIRATORY (INHALATION) at 08:01

## 2018-05-20 RX ADMIN — ENOXAPARIN SODIUM SCH MG: 40 INJECTION SUBCUTANEOUS at 07:46

## 2018-05-20 RX ADMIN — AZITHROMYCIN SCH MG: 500 TABLET, FILM COATED ORAL at 07:46

## 2018-05-20 RX ADMIN — IPRATROPIUM BROMIDE AND ALBUTEROL SULFATE SCH ML: .5; 3 SOLUTION RESPIRATORY (INHALATION) at 00:25

## 2018-05-20 RX ADMIN — INSULIN ASPART SCH UNIT: 100 INJECTION, SOLUTION INTRAVENOUS; SUBCUTANEOUS at 07:45

## 2018-05-20 RX ADMIN — CEFUROXIME AXETIL SCH MG: 250 TABLET ORAL at 07:45

## 2018-05-20 RX ADMIN — INSULIN ASPART SCH UNIT: 100 INJECTION, SOLUTION INTRAVENOUS; SUBCUTANEOUS at 12:05

## 2018-05-20 RX ADMIN — LOSARTAN POTASSIUM SCH MG: 50 TABLET, FILM COATED ORAL at 07:47

## 2018-05-20 RX ADMIN — LEVOTHYROXINE SODIUM SCH MCG: 25 TABLET ORAL at 06:21

## 2018-05-21 NOTE — DS
DISCHARGE SUMMARY



DATE OF ADMISSION:

05/15/2018



DATE OF DISCHARGE:

05/20/2018



FINAL DIAGNOSES:

1. Pneumonia suspect gram-negative organism.

2. Acute chronic obstructive pulmonary disease exacerbation in a current smoker.

3. Chronic nicotine dependence in a cigarette smoker.

4. Diabetes mellitus type 2, uncontrolled with hyperglycemia.

5. Essential hypertension.

6. Hypothyroidism.

7. Chronic low back pain from osteoarthritis.

8. Chronically elevated left diaphragm.



HOSPITAL COURSE:

This is a patient presented with pneumonia, COPD exacerbation, doing much better with

antibiotics.  By the time of discharge, tolerating a diet.



ON EXAM:

LUNGS: Improved air entry.

CARDIOVASCULAR: First and second sounds normal.



CONSULTATION:

Dr. Rogelio Curiel from Pulmonary.



DISCHARGE MEDICATIONS:

1. Metformin 1000 mg b.i.d.

2. Cozaar 100 mg daily.

3. Advil 200 mg q.8 p.r.n.

4. Synthroid 25 mcg a day.

5. Norco 10 one tablet t.i.d. p.r.n.

6. Ceftin 500 mg p.o. b.i.d. 6 tablets.

7. DuoNeb t.i.d.

8. Melatonin 3 mg at bedtime p.r.n.

9. Nicotine patch 14, 30.

10.Prednisone taper.

Follow up with Dr. Rogelio Curiel in one week.





MMODL / IJN: [id number] / Job#: [id number]
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
## 2018-11-19 ENCOUNTER — HOSPITAL ENCOUNTER (EMERGENCY)
Dept: HOSPITAL 47 - EC | Age: 73
Discharge: HOME | End: 2018-11-19
Payer: MEDICARE

## 2018-11-19 VITALS — TEMPERATURE: 98.6 F | SYSTOLIC BLOOD PRESSURE: 151 MMHG | DIASTOLIC BLOOD PRESSURE: 90 MMHG | HEART RATE: 70 BPM

## 2018-11-19 VITALS — RESPIRATION RATE: 18 BRPM

## 2018-11-19 DIAGNOSIS — F17.200: ICD-10-CM

## 2018-11-19 DIAGNOSIS — E03.9: ICD-10-CM

## 2018-11-19 DIAGNOSIS — Z79.899: ICD-10-CM

## 2018-11-19 DIAGNOSIS — Z79.84: ICD-10-CM

## 2018-11-19 DIAGNOSIS — K21.9: ICD-10-CM

## 2018-11-19 DIAGNOSIS — E11.9: ICD-10-CM

## 2018-11-19 DIAGNOSIS — J44.9: ICD-10-CM

## 2018-11-19 DIAGNOSIS — R51: Primary | ICD-10-CM

## 2018-11-19 DIAGNOSIS — I10: ICD-10-CM

## 2018-11-19 DIAGNOSIS — Z79.1: ICD-10-CM

## 2018-11-19 LAB
ALBUMIN SERPL-MCNC: 4.5 G/DL (ref 3.5–5)
ALP SERPL-CCNC: 102 U/L (ref 38–126)
ALT SERPL-CCNC: 30 U/L (ref 21–72)
ANION GAP SERPL CALC-SCNC: 13 MMOL/L
APTT BLD: 22.9 SEC (ref 22–30)
AST SERPL-CCNC: 37 U/L (ref 17–59)
BASOPHILS # BLD AUTO: 0 K/UL (ref 0–0.2)
BASOPHILS NFR BLD AUTO: 1 %
BUN SERPL-SCNC: 9 MG/DL (ref 9–20)
CALCIUM SPEC-MCNC: 9.5 MG/DL (ref 8.4–10.2)
CHLORIDE SERPL-SCNC: 103 MMOL/L (ref 98–107)
CK SERPL-CCNC: 69 U/L (ref 55–170)
CO2 SERPL-SCNC: 23 MMOL/L (ref 22–30)
EOSINOPHIL # BLD AUTO: 0.2 K/UL (ref 0–0.7)
EOSINOPHIL NFR BLD AUTO: 2 %
ERYTHROCYTE [DISTWIDTH] IN BLOOD BY AUTOMATED COUNT: 4.87 M/UL (ref 4.3–5.9)
ERYTHROCYTE [DISTWIDTH] IN BLOOD: 13.6 % (ref 11.5–15.5)
GLUCOSE SERPL-MCNC: 189 MG/DL (ref 74–99)
HCT VFR BLD AUTO: 43.6 % (ref 39–53)
HGB BLD-MCNC: 14.2 GM/DL (ref 13–17.5)
INR PPP: 0.9 (ref ?–1.2)
LYMPHOCYTES # SPEC AUTO: 1.9 K/UL (ref 1–4.8)
LYMPHOCYTES NFR SPEC AUTO: 23 %
MAGNESIUM SPEC-SCNC: 2.6 MG/DL (ref 1.6–2.3)
MCH RBC QN AUTO: 29.1 PG (ref 25–35)
MCHC RBC AUTO-ENTMCNC: 32.6 G/DL (ref 31–37)
MCV RBC AUTO: 89.4 FL (ref 80–100)
MONOCYTES # BLD AUTO: 0.5 K/UL (ref 0–1)
MONOCYTES NFR BLD AUTO: 6 %
NEUTROPHILS # BLD AUTO: 5.6 K/UL (ref 1.3–7.7)
NEUTROPHILS NFR BLD AUTO: 66 %
PLATELET # BLD AUTO: 324 K/UL (ref 150–450)
POTASSIUM SERPL-SCNC: 4.2 MMOL/L (ref 3.5–5.1)
PROT SERPL-MCNC: 7.8 G/DL (ref 6.3–8.2)
PT BLD: 9.4 SEC (ref 9–12)
SODIUM SERPL-SCNC: 139 MMOL/L (ref 137–145)
TROPONIN I SERPL-MCNC: <0.012 NG/ML (ref 0–0.03)
WBC # BLD AUTO: 8.5 K/UL (ref 3.8–10.6)

## 2018-11-19 PROCEDURE — 71046 X-RAY EXAM CHEST 2 VIEWS: CPT

## 2018-11-19 PROCEDURE — 96375 TX/PRO/DX INJ NEW DRUG ADDON: CPT

## 2018-11-19 PROCEDURE — 82550 ASSAY OF CK (CPK): CPT

## 2018-11-19 PROCEDURE — 96374 THER/PROPH/DIAG INJ IV PUSH: CPT

## 2018-11-19 PROCEDURE — 36415 COLL VENOUS BLD VENIPUNCTURE: CPT

## 2018-11-19 PROCEDURE — 85025 COMPLETE CBC W/AUTO DIFF WBC: CPT

## 2018-11-19 PROCEDURE — 85610 PROTHROMBIN TIME: CPT

## 2018-11-19 PROCEDURE — 80053 COMPREHEN METABOLIC PANEL: CPT

## 2018-11-19 PROCEDURE — 84484 ASSAY OF TROPONIN QUANT: CPT

## 2018-11-19 PROCEDURE — 99284 EMERGENCY DEPT VISIT MOD MDM: CPT

## 2018-11-19 PROCEDURE — 93005 ELECTROCARDIOGRAM TRACING: CPT

## 2018-11-19 PROCEDURE — 83735 ASSAY OF MAGNESIUM: CPT

## 2018-11-19 PROCEDURE — 85730 THROMBOPLASTIN TIME PARTIAL: CPT

## 2018-11-19 PROCEDURE — 70450 CT HEAD/BRAIN W/O DYE: CPT

## 2018-11-19 PROCEDURE — 82553 CREATINE MB FRACTION: CPT

## 2018-11-19 NOTE — XR
EXAMINATION TYPE: XR chest 2V

 

DATE OF EXAM: 11/19/2018

 

COMPARISON: 5/15/2018

 

HISTORY: 73-year-old male with chest pain and shortness of breath

 

TECHNIQUE:  PA and lateral views

 

FINDINGS:  

Heart normal size. Mild elongation thoracic aorta. Mild diffuse interstitial prominence. Some minimal
 blunting of the right costophrenic angle without appreciable pleural effusion on the lateral view valencia
ggesting pleural parenchymal scarring. Strandy and patchy left basilar opacity. Stable line along the
 medial right upper lobe from prior wedge resection. Some more focal patchy peripheral right mid to u
pper lung opacity.

 

 

IMPRESSION:  

COPD with some atelectasis or early developing infiltrate at the peripheral right upper and midlung. 
Correlate for any infectious signs/symptoms. Continued bibasilar areas of atelectasis or scarring.

## 2018-11-19 NOTE — CT
EXAMINATION TYPE: CT brain wo con

 

DATE OF EXAM: 11/19/2018

 

COMPARISON: 3/10/2018

 

HISTORY: hypertension/headache

 

CT DLP: 855.3 mGycm

 

Unenhanced CT of the brain was performed.  

 

The ventricles, basal cisterns and sulci overlying the cerebral convexities demonstrate mild enlargem
ent. 

 

There is no evidence for intracranial hemorrhage or sulcal effacement.  

 

There is decreased attenuation about the periventricular white matter and deep white matter of both c
erebral hemispheres, compatible with chronic small vessel ischemia. Differential diagnosis does inclu
de demyelination. 

 

No mass effects are seen.No midline shift.

 

Osseous calvarium is intact.  Left maxillary mucosal thickening.

 

If symptoms persist consider MRI.  

 

IMPRESSION:

 

1. Age related atrophic and chronic small vessel ischemic change without acute intracranial process s
een at this time.
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
## 2018-12-09 ENCOUNTER — HOSPITAL ENCOUNTER (INPATIENT)
Dept: HOSPITAL 47 - EC | Age: 73
LOS: 4 days | Discharge: HOME | DRG: 871 | End: 2018-12-13
Payer: MEDICARE

## 2018-12-09 VITALS — BODY MASS INDEX: 26.2 KG/M2

## 2018-12-09 DIAGNOSIS — I44.4: ICD-10-CM

## 2018-12-09 DIAGNOSIS — E03.9: ICD-10-CM

## 2018-12-09 DIAGNOSIS — K21.9: ICD-10-CM

## 2018-12-09 DIAGNOSIS — I11.9: ICD-10-CM

## 2018-12-09 DIAGNOSIS — E11.42: ICD-10-CM

## 2018-12-09 DIAGNOSIS — M25.512: ICD-10-CM

## 2018-12-09 DIAGNOSIS — Z83.3: ICD-10-CM

## 2018-12-09 DIAGNOSIS — Z79.84: ICD-10-CM

## 2018-12-09 DIAGNOSIS — F17.210: ICD-10-CM

## 2018-12-09 DIAGNOSIS — Z81.1: ICD-10-CM

## 2018-12-09 DIAGNOSIS — A41.9: Primary | ICD-10-CM

## 2018-12-09 DIAGNOSIS — Z79.890: ICD-10-CM

## 2018-12-09 DIAGNOSIS — J20.9: ICD-10-CM

## 2018-12-09 DIAGNOSIS — Z87.81: ICD-10-CM

## 2018-12-09 DIAGNOSIS — Z87.19: ICD-10-CM

## 2018-12-09 DIAGNOSIS — M54.5: ICD-10-CM

## 2018-12-09 DIAGNOSIS — E11.65: ICD-10-CM

## 2018-12-09 DIAGNOSIS — Z71.6: ICD-10-CM

## 2018-12-09 DIAGNOSIS — E83.42: ICD-10-CM

## 2018-12-09 DIAGNOSIS — J43.9: ICD-10-CM

## 2018-12-09 DIAGNOSIS — G43.909: ICD-10-CM

## 2018-12-09 DIAGNOSIS — J18.9: ICD-10-CM

## 2018-12-09 DIAGNOSIS — Z86.010: ICD-10-CM

## 2018-12-09 DIAGNOSIS — I25.10: ICD-10-CM

## 2018-12-09 DIAGNOSIS — Z79.899: ICD-10-CM

## 2018-12-09 DIAGNOSIS — G89.29: ICD-10-CM

## 2018-12-09 DIAGNOSIS — Z86.718: ICD-10-CM

## 2018-12-09 DIAGNOSIS — E87.5: ICD-10-CM

## 2018-12-09 DIAGNOSIS — R09.02: ICD-10-CM

## 2018-12-09 DIAGNOSIS — J98.11: ICD-10-CM

## 2018-12-09 DIAGNOSIS — T38.0X5A: ICD-10-CM

## 2018-12-09 DIAGNOSIS — M25.511: ICD-10-CM

## 2018-12-09 DIAGNOSIS — J32.9: ICD-10-CM

## 2018-12-09 DIAGNOSIS — Z90.49: ICD-10-CM

## 2018-12-09 LAB
BASOPHILS # BLD AUTO: 0.1 K/UL (ref 0–0.2)
BASOPHILS NFR BLD AUTO: 0 %
EOSINOPHIL # BLD AUTO: 0.2 K/UL (ref 0–0.7)
EOSINOPHIL NFR BLD AUTO: 2 %
ERYTHROCYTE [DISTWIDTH] IN BLOOD BY AUTOMATED COUNT: 4.31 M/UL (ref 4.3–5.9)
ERYTHROCYTE [DISTWIDTH] IN BLOOD: 13.4 % (ref 11.5–15.5)
HCT VFR BLD AUTO: 38.3 % (ref 39–53)
HGB BLD-MCNC: 12.5 GM/DL (ref 13–17.5)
LYMPHOCYTES # SPEC AUTO: 1.2 K/UL (ref 1–4.8)
LYMPHOCYTES NFR SPEC AUTO: 10 %
MCH RBC QN AUTO: 29.1 PG (ref 25–35)
MCHC RBC AUTO-ENTMCNC: 32.7 G/DL (ref 31–37)
MCV RBC AUTO: 89 FL (ref 80–100)
MONOCYTES # BLD AUTO: 0.6 K/UL (ref 0–1)
MONOCYTES NFR BLD AUTO: 5 %
NEUTROPHILS # BLD AUTO: 9.9 K/UL (ref 1.3–7.7)
NEUTROPHILS NFR BLD AUTO: 81 %
PLATELET # BLD AUTO: 258 K/UL (ref 150–450)
WBC # BLD AUTO: 12.3 K/UL (ref 3.8–10.6)

## 2018-12-09 PROCEDURE — 70553 MRI BRAIN STEM W/O & W/DYE: CPT

## 2018-12-09 PROCEDURE — 80053 COMPREHEN METABOLIC PANEL: CPT

## 2018-12-09 PROCEDURE — 84132 ASSAY OF SERUM POTASSIUM: CPT

## 2018-12-09 PROCEDURE — 85610 PROTHROMBIN TIME: CPT

## 2018-12-09 PROCEDURE — 94640 AIRWAY INHALATION TREATMENT: CPT

## 2018-12-09 PROCEDURE — 95816 EEG AWAKE AND DROWSY: CPT

## 2018-12-09 PROCEDURE — 96368 THER/DIAG CONCURRENT INF: CPT

## 2018-12-09 PROCEDURE — 96365 THER/PROPH/DIAG IV INF INIT: CPT

## 2018-12-09 PROCEDURE — 99285 EMERGENCY DEPT VISIT HI MDM: CPT

## 2018-12-09 PROCEDURE — 82550 ASSAY OF CK (CPK): CPT

## 2018-12-09 PROCEDURE — 83735 ASSAY OF MAGNESIUM: CPT

## 2018-12-09 PROCEDURE — 83036 HEMOGLOBIN GLYCOSYLATED A1C: CPT

## 2018-12-09 PROCEDURE — 82553 CREATINE MB FRACTION: CPT

## 2018-12-09 PROCEDURE — 36415 COLL VENOUS BLD VENIPUNCTURE: CPT

## 2018-12-09 PROCEDURE — 81003 URINALYSIS AUTO W/O SCOPE: CPT

## 2018-12-09 PROCEDURE — 85025 COMPLETE CBC W/AUTO DIFF WBC: CPT

## 2018-12-09 PROCEDURE — 83880 ASSAY OF NATRIURETIC PEPTIDE: CPT

## 2018-12-09 PROCEDURE — 83605 ASSAY OF LACTIC ACID: CPT

## 2018-12-09 PROCEDURE — 71046 X-RAY EXAM CHEST 2 VIEWS: CPT

## 2018-12-09 PROCEDURE — 84484 ASSAY OF TROPONIN QUANT: CPT

## 2018-12-09 PROCEDURE — 87040 BLOOD CULTURE FOR BACTERIA: CPT

## 2018-12-09 PROCEDURE — 87502 INFLUENZA DNA AMP PROBE: CPT

## 2018-12-09 PROCEDURE — 96361 HYDRATE IV INFUSION ADD-ON: CPT

## 2018-12-09 PROCEDURE — 93005 ELECTROCARDIOGRAM TRACING: CPT

## 2018-12-09 PROCEDURE — 85730 THROMBOPLASTIN TIME PARTIAL: CPT
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
## 2018-12-10 LAB
ALBUMIN SERPL-MCNC: 3.9 G/DL (ref 3.5–5)
ALBUMIN SERPL-MCNC: 4.3 G/DL (ref 3.5–5)
ALP SERPL-CCNC: 77 U/L (ref 38–126)
ALP SERPL-CCNC: 84 U/L (ref 38–126)
ALT SERPL-CCNC: 29 U/L (ref 21–72)
ALT SERPL-CCNC: 37 U/L (ref 21–72)
ANION GAP SERPL CALC-SCNC: 10 MMOL/L
ANION GAP SERPL CALC-SCNC: 12 MMOL/L
APTT BLD: 23.8 SEC (ref 22–30)
AST SERPL-CCNC: 34 U/L (ref 17–59)
AST SERPL-CCNC: 39 U/L (ref 17–59)
BASOPHILS # BLD AUTO: 0 K/UL (ref 0–0.2)
BASOPHILS NFR BLD AUTO: 0 %
BUN SERPL-SCNC: 12 MG/DL (ref 9–20)
BUN SERPL-SCNC: 21 MG/DL (ref 9–20)
CALCIUM SPEC-MCNC: 9.2 MG/DL (ref 8.4–10.2)
CALCIUM SPEC-MCNC: 9.3 MG/DL (ref 8.4–10.2)
CHLORIDE SERPL-SCNC: 104 MMOL/L (ref 98–107)
CHLORIDE SERPL-SCNC: 108 MMOL/L (ref 98–107)
CK SERPL-CCNC: 288 U/L (ref 55–170)
CO2 SERPL-SCNC: 20 MMOL/L (ref 22–30)
CO2 SERPL-SCNC: 22 MMOL/L (ref 22–30)
EOSINOPHIL # BLD AUTO: 0.1 K/UL (ref 0–0.7)
EOSINOPHIL NFR BLD AUTO: 0 %
ERYTHROCYTE [DISTWIDTH] IN BLOOD BY AUTOMATED COUNT: 3.89 M/UL (ref 4.3–5.9)
ERYTHROCYTE [DISTWIDTH] IN BLOOD: 13.4 % (ref 11.5–15.5)
GLUCOSE BLD-MCNC: 329 MG/DL (ref 75–99)
GLUCOSE BLD-MCNC: 372 MG/DL (ref 75–99)
GLUCOSE BLD-MCNC: 412 MG/DL (ref 75–99)
GLUCOSE BLD-MCNC: 511 MG/DL (ref 75–99)
GLUCOSE SERPL-MCNC: 177 MG/DL (ref 74–99)
GLUCOSE SERPL-MCNC: 429 MG/DL (ref 74–99)
GLUCOSE UR QL: (no result)
HBA1C MFR BLD: 9.1 % (ref 4–6)
HCT VFR BLD AUTO: 35.4 % (ref 39–53)
HGB BLD-MCNC: 11.6 GM/DL (ref 13–17.5)
INR PPP: 0.9 (ref ?–1.2)
KETONES UR QL STRIP.AUTO: (no result)
LYMPHOCYTES # SPEC AUTO: 0.8 K/UL (ref 1–4.8)
LYMPHOCYTES NFR SPEC AUTO: 6 %
MAGNESIUM SPEC-SCNC: 1.5 MG/DL (ref 1.6–2.3)
MAGNESIUM SPEC-SCNC: 2.1 MG/DL (ref 1.6–2.3)
MCH RBC QN AUTO: 29.9 PG (ref 25–35)
MCHC RBC AUTO-ENTMCNC: 32.8 G/DL (ref 31–37)
MCV RBC AUTO: 91.1 FL (ref 80–100)
MONOCYTES # BLD AUTO: 0.3 K/UL (ref 0–1)
MONOCYTES NFR BLD AUTO: 3 %
NEUTROPHILS # BLD AUTO: 11.6 K/UL (ref 1.3–7.7)
NEUTROPHILS NFR BLD AUTO: 90 %
PH UR: 5.5 [PH] (ref 5–8)
PLATELET # BLD AUTO: 231 K/UL (ref 150–450)
POTASSIUM SERPL-SCNC: 4.3 MMOL/L (ref 3.5–5.1)
POTASSIUM SERPL-SCNC: 4.8 MMOL/L (ref 3.5–5.1)
PROT SERPL-MCNC: 6.6 G/DL (ref 6.3–8.2)
PROT SERPL-MCNC: 7.1 G/DL (ref 6.3–8.2)
PT BLD: 9.6 SEC (ref 9–12)
SODIUM SERPL-SCNC: 136 MMOL/L (ref 137–145)
SODIUM SERPL-SCNC: 140 MMOL/L (ref 137–145)
SP GR UR: 1.01 (ref 1–1.03)
TROPONIN I SERPL-MCNC: <0.012 NG/ML (ref 0–0.03)
UROBILINOGEN UR QL STRIP: <2 MG/DL (ref ?–2)
WBC # BLD AUTO: 12.9 K/UL (ref 3.8–10.6)

## 2018-12-10 RX ADMIN — LEVOTHYROXINE SODIUM SCH MCG: 25 TABLET ORAL at 09:15

## 2018-12-10 RX ADMIN — METOCLOPRAMIDE SCH MG: 5 INJECTION, SOLUTION INTRAMUSCULAR; INTRAVENOUS at 19:26

## 2018-12-10 RX ADMIN — ISODIUM CHLORIDE SCH MG: 0.03 SOLUTION RESPIRATORY (INHALATION) at 17:06

## 2018-12-10 RX ADMIN — NAPROXEN SCH MG: 250 TABLET ORAL at 09:14

## 2018-12-10 RX ADMIN — INSULIN ASPART SCH UNIT: 100 INJECTION, SOLUTION INTRAVENOUS; SUBCUTANEOUS at 21:18

## 2018-12-10 RX ADMIN — AZITHROMYCIN SCH MG: 500 TABLET, FILM COATED ORAL at 23:27

## 2018-12-10 RX ADMIN — ENOXAPARIN SODIUM SCH MG: 40 INJECTION SUBCUTANEOUS at 13:06

## 2018-12-10 RX ADMIN — HYDROCODONE BITARTRATE AND ACETAMINOPHEN PRN EACH: 10; 325 TABLET ORAL at 17:24

## 2018-12-10 RX ADMIN — ISODIUM CHLORIDE SCH MG: 0.03 SOLUTION RESPIRATORY (INHALATION) at 11:03

## 2018-12-10 RX ADMIN — DIVALPROEX SODIUM SCH MG: 250 TABLET, DELAYED RELEASE ORAL at 19:22

## 2018-12-10 RX ADMIN — HYDROCODONE BITARTRATE AND ACETAMINOPHEN PRN EACH: 10; 325 TABLET ORAL at 10:23

## 2018-12-10 RX ADMIN — NAPROXEN SCH MG: 250 TABLET ORAL at 21:18

## 2018-12-10 RX ADMIN — NICOTINE SCH PATCH: 14 PATCH, EXTENDED RELEASE TRANSDERMAL at 12:28

## 2018-12-10 RX ADMIN — ISODIUM CHLORIDE SCH: 0.03 SOLUTION RESPIRATORY (INHALATION) at 20:53

## 2018-12-10 RX ADMIN — ISODIUM CHLORIDE SCH MG: 0.03 SOLUTION RESPIRATORY (INHALATION) at 07:18

## 2018-12-10 RX ADMIN — CEFAZOLIN SCH MLS/HR: 330 INJECTION, POWDER, FOR SOLUTION INTRAMUSCULAR; INTRAVENOUS at 01:54

## 2018-12-10 RX ADMIN — LOSARTAN POTASSIUM AND HYDROCHLOROTHIAZIDE SCH EACH: 12.5; 5 TABLET ORAL at 09:16

## 2018-12-10 RX ADMIN — INSULIN ASPART SCH UNIT: 100 INJECTION, SOLUTION INTRAVENOUS; SUBCUTANEOUS at 17:27

## 2018-12-10 RX ADMIN — INSULIN ASPART SCH UNIT: 100 INJECTION, SOLUTION INTRAVENOUS; SUBCUTANEOUS at 12:29

## 2018-12-10 RX ADMIN — CEFAZOLIN SCH MLS/HR: 330 INJECTION, POWDER, FOR SOLUTION INTRAMUSCULAR; INTRAVENOUS at 16:58
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
## 2018-12-10 NOTE — XR
EXAMINATION TYPE: XR chest 2V

 

DATE OF EXAM: 12/10/2018

 

COMPARISON: 11/19/2018

 

HISTORY: Difficulty breathing

 

TECHNIQUE:  Frontal and lateral views of the chest are obtained.

 

FINDINGS:  There is patchy atelectasis at the lung bases. There is no heart failure. Heart size is no
rmal. There are no hilar masses. Mediastinum is normal.

 

IMPRESSION:  Bilateral lower lobe basilar atelectasis  slightly worse than last exam. Normal heart.
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
## 2018-12-10 NOTE — MR
EXAMINATION TYPE: MR brain wo/w con

 

DATE OF EXAM: 12/10/2018

 

COMPARISON: None

 

HISTORY: New onset headache

 

TECHNIQUE: 

Multiplanar, multisequence images of the brain and brainstem is performed without and with IV contras
t, utilizing 7.5 mL intravenous Gadavist .

 

FINDINGS: There is cerebral cortical atrophy. There is no mass effect nor midline shift. There is no 
sign of intracranial hemorrhage. There is no evidence of cortical infarct. There is coalescent increa
sed signal in the periventricular white matter with areas that measure up to 2 cm. The brain stem is 
intact. There is some mucosal thickening in the ethmoid sinus. Sella turcica appears normal.

 

The contrast images show no pathologic enhancement. There is no evidence of an orbital mass. There is
 normal contrast opacification of the venous sinuses. There is arterial flow in the anterior middle a
nd posterior cerebral arteries. There is arterial flow in the vertebrobasilar artery system. Sella tu
rcica appears normal.

 

IMPRESSION: Mild atrophy. Moderate periventricular white matter changes. I would consider both demyel
inating disease and chronic small vessel ischemia.

 

Mild sinusitis.

## 2018-12-11 LAB
ALBUMIN SERPL-MCNC: 4.1 G/DL (ref 3.5–5)
ALP SERPL-CCNC: 76 U/L (ref 38–126)
ALT SERPL-CCNC: 34 U/L (ref 21–72)
ANION GAP SERPL CALC-SCNC: 14 MMOL/L
AST SERPL-CCNC: 40 U/L (ref 17–59)
BASOPHILS # BLD AUTO: 0 K/UL (ref 0–0.2)
BASOPHILS NFR BLD AUTO: 0 %
BUN SERPL-SCNC: 21 MG/DL (ref 9–20)
CALCIUM SPEC-MCNC: 9.6 MG/DL (ref 8.4–10.2)
CHLORIDE SERPL-SCNC: 100 MMOL/L (ref 98–107)
CO2 SERPL-SCNC: 23 MMOL/L (ref 22–30)
EOSINOPHIL # BLD AUTO: 0 K/UL (ref 0–0.7)
EOSINOPHIL NFR BLD AUTO: 0 %
ERYTHROCYTE [DISTWIDTH] IN BLOOD BY AUTOMATED COUNT: 3.98 M/UL (ref 4.3–5.9)
ERYTHROCYTE [DISTWIDTH] IN BLOOD: 13.4 % (ref 11.5–15.5)
GLUCOSE BLD-MCNC: 229 MG/DL (ref 75–99)
GLUCOSE BLD-MCNC: 297 MG/DL (ref 75–99)
GLUCOSE BLD-MCNC: 304 MG/DL (ref 75–99)
GLUCOSE BLD-MCNC: 375 MG/DL (ref 75–99)
GLUCOSE SERPL-MCNC: 266 MG/DL (ref 74–99)
HCT VFR BLD AUTO: 36.7 % (ref 39–53)
HGB BLD-MCNC: 12 GM/DL (ref 13–17.5)
LYMPHOCYTES # SPEC AUTO: 1.6 K/UL (ref 1–4.8)
LYMPHOCYTES NFR SPEC AUTO: 9 %
MAGNESIUM SPEC-SCNC: 1.8 MG/DL (ref 1.6–2.3)
MCH RBC QN AUTO: 30.1 PG (ref 25–35)
MCHC RBC AUTO-ENTMCNC: 32.6 G/DL (ref 31–37)
MCV RBC AUTO: 92.1 FL (ref 80–100)
MONOCYTES # BLD AUTO: 0.6 K/UL (ref 0–1)
MONOCYTES NFR BLD AUTO: 3 %
NEUTROPHILS # BLD AUTO: 15.1 K/UL (ref 1.3–7.7)
NEUTROPHILS NFR BLD AUTO: 87 %
PLATELET # BLD AUTO: 226 K/UL (ref 150–450)
POTASSIUM SERPL-SCNC: 4.7 MMOL/L (ref 3.5–5.1)
PROT SERPL-MCNC: 7 G/DL (ref 6.3–8.2)
SODIUM SERPL-SCNC: 137 MMOL/L (ref 137–145)
WBC # BLD AUTO: 17.4 K/UL (ref 3.8–10.6)

## 2018-12-11 RX ADMIN — METOCLOPRAMIDE SCH MG: 5 INJECTION, SOLUTION INTRAMUSCULAR; INTRAVENOUS at 03:42

## 2018-12-11 RX ADMIN — NICOTINE SCH: 14 PATCH, EXTENDED RELEASE TRANSDERMAL at 08:17

## 2018-12-11 RX ADMIN — ISODIUM CHLORIDE SCH MG: 0.03 SOLUTION RESPIRATORY (INHALATION) at 11:24

## 2018-12-11 RX ADMIN — NAPROXEN SCH MG: 250 TABLET ORAL at 08:11

## 2018-12-11 RX ADMIN — DIVALPROEX SODIUM SCH MG: 250 TABLET, DELAYED RELEASE ORAL at 08:12

## 2018-12-11 RX ADMIN — HYDROCODONE BITARTRATE AND ACETAMINOPHEN PRN EACH: 10; 325 TABLET ORAL at 08:09

## 2018-12-11 RX ADMIN — HYDROCODONE BITARTRATE AND ACETAMINOPHEN PRN EACH: 10; 325 TABLET ORAL at 00:29

## 2018-12-11 RX ADMIN — CEFAZOLIN SCH MLS/HR: 330 INJECTION, POWDER, FOR SOLUTION INTRAMUSCULAR; INTRAVENOUS at 21:30

## 2018-12-11 RX ADMIN — CEFAZOLIN SCH MLS/HR: 330 INJECTION, POWDER, FOR SOLUTION INTRAMUSCULAR; INTRAVENOUS at 03:46

## 2018-12-11 RX ADMIN — DIVALPROEX SODIUM SCH MG: 250 TABLET, DELAYED RELEASE ORAL at 22:25

## 2018-12-11 RX ADMIN — ISODIUM CHLORIDE SCH MG: 0.03 SOLUTION RESPIRATORY (INHALATION) at 19:59

## 2018-12-11 RX ADMIN — INSULIN ASPART SCH UNIT: 100 INJECTION, SOLUTION INTRAVENOUS; SUBCUTANEOUS at 17:47

## 2018-12-11 RX ADMIN — HYDROCODONE BITARTRATE AND ACETAMINOPHEN PRN EACH: 10; 325 TABLET ORAL at 16:46

## 2018-12-11 RX ADMIN — INSULIN ASPART SCH UNIT: 100 INJECTION, SOLUTION INTRAVENOUS; SUBCUTANEOUS at 12:48

## 2018-12-11 RX ADMIN — KETOROLAC TROMETHAMINE PRN MG: 30 INJECTION, SOLUTION INTRAMUSCULAR at 16:45

## 2018-12-11 RX ADMIN — METOCLOPRAMIDE SCH: 5 INJECTION, SOLUTION INTRAMUSCULAR; INTRAVENOUS at 17:45

## 2018-12-11 RX ADMIN — ISODIUM CHLORIDE SCH MG: 0.03 SOLUTION RESPIRATORY (INHALATION) at 07:51

## 2018-12-11 RX ADMIN — ENOXAPARIN SODIUM SCH MG: 40 INJECTION SUBCUTANEOUS at 08:10

## 2018-12-11 RX ADMIN — METHYLPREDNISOLONE SODIUM SUCCINATE SCH MG: 125 INJECTION, POWDER, FOR SOLUTION INTRAMUSCULAR; INTRAVENOUS at 08:05

## 2018-12-11 RX ADMIN — CEFAZOLIN SCH: 330 INJECTION, POWDER, FOR SOLUTION INTRAMUSCULAR; INTRAVENOUS at 18:38

## 2018-12-11 RX ADMIN — LEVOTHYROXINE SODIUM SCH MCG: 25 TABLET ORAL at 06:27

## 2018-12-11 RX ADMIN — DIVALPROEX SODIUM SCH MG: 250 TABLET, DELAYED RELEASE ORAL at 16:48

## 2018-12-11 RX ADMIN — NAPROXEN SCH MG: 250 TABLET ORAL at 22:24

## 2018-12-11 RX ADMIN — FAMOTIDINE SCH MG: 20 TABLET, FILM COATED ORAL at 08:08

## 2018-12-11 RX ADMIN — METHYLPREDNISOLONE SODIUM SUCCINATE SCH MG: 125 INJECTION, POWDER, FOR SOLUTION INTRAMUSCULAR; INTRAVENOUS at 03:41

## 2018-12-11 RX ADMIN — LOSARTAN POTASSIUM AND HYDROCHLOROTHIAZIDE SCH EACH: 12.5; 5 TABLET ORAL at 08:11

## 2018-12-11 RX ADMIN — METHYLPREDNISOLONE SODIUM SUCCINATE SCH MG: 125 INJECTION, POWDER, FOR SOLUTION INTRAMUSCULAR; INTRAVENOUS at 16:44

## 2018-12-11 RX ADMIN — METOCLOPRAMIDE SCH: 5 INJECTION, SOLUTION INTRAMUSCULAR; INTRAVENOUS at 10:49

## 2018-12-11 RX ADMIN — ISODIUM CHLORIDE SCH MG: 0.03 SOLUTION RESPIRATORY (INHALATION) at 16:37

## 2018-12-11 RX ADMIN — INSULIN ASPART SCH UNIT: 100 INJECTION, SOLUTION INTRAVENOUS; SUBCUTANEOUS at 08:02

## 2018-12-11 RX ADMIN — INSULIN ASPART SCH UNIT: 100 INJECTION, SOLUTION INTRAVENOUS; SUBCUTANEOUS at 21:27

## 2018-12-11 NOTE — CONS
CONSULTATION



DATE OF CONSULTATION:

12/10/2018



CHIEF COMPLAINT:

Headache.



HISTORY OF PRESENT ILLNESS:

Mr. Jo is a pleasant 73-year-old male, who is being evaluated today on

12/10/2018 by the neurology service per the request of Dr. Vasquez for intractable

headache.  The patient was brought into [facility] Emergency Room with

the main complaint of uncontrolled coughing and shortness of breath.  He also had a

fever with a temperature of 102.5.  His CBC showed leukocytosis at 12.3, and his chest

x-ray showed evidence of right lower lobe pneumonia.  He was started on IV antibiotics

and admitted for further workup and management.  He was also complaining of a headache

that is mostly in the bilateral frontal region.  He started having this headache

several weeks ago and was recently started on Depakote with no improvements.  He states

that he has not had any radiological imaging done for his new onset headache.  He

denies any history of migraines.  His comprehensive metabolic profile was normal except

for hyperglycemia at 177.  His cardiac enzymes were normal except for slightly elevated

CPK at 288.  His urinalysis and flu tests were normal.  At the time of my evaluation,

he is lying in his bed and appears to be in no acute distress.  He is complaining of

headache at the time of my evaluation, and rates it at 7 out of 10 in intensity.  He

describes the pain as a pressure pain and denies any associated photophobia or

phonophobia, nausea or vomiting.  He is also complaining of low back pain.  He states

he has chronic low back pain for several years. He denied any specific injury or

trauma.  He does take Norco at home as needed.



PAST MEDICAL HISTORY:

Chronic obstructive pulmonary disease, diabetes, gastroesophageal reflux disease,

hypertension, hypothyroidism, chronic low back pain, history of lower GI bleed, history

of orthopedic surgeries and cholecystectomy.



SOCIAL HISTORY:

The patient is a current everyday smoker.  He denies any alcohol or drug use.



FAMILY HISTORY:

Positive for diabetes.



HOME MEDICATIONS:

Reviewed in the chart.



ALLERGIES:

No known drug allergies.



REVIEW OF SYSTEMS:

CONSTITUTIONAL:  Positive for fatigue.

EYES:  Negative.

ENT:  Negative.

CARDIOVASCULAR:  Negative.

RESPIRATORY:  As mentioned above.

NEUROLOGICAL:  As mentioned above.  He denies any lateralizing weakness.  He does

report numbness and tingling in bilateral feet.

GASTROINTESTINAL:  Negative.

GENITOURINARY:  Negative.

PSYCHIATRIC:  Negative.

MUSCULOSKELETAL:  As mentioned above.

ENDOCRINE:  Positive for diabetes and hypothyroidism.

DERMATOLOGICAL:  Negative.



PHYSICAL EXAM:

Vital signs show a temperature 97.6, pulse 77, respiration 17, blood pressure 118/72.

GENERAL APPEARANCE:  The patient is a well-developed, elderly male, who appears to be

in no acute distress.

HEENT:  Normocephalic, atraumatic, no facial asymmetry is seen.

NECK:  Supple with no masses felt.

CARDIOVASCULAR:  Regular rate and rhythm.

ABDOMEN:  Nontender, nondistended.

Extremities showed edema with no clubbing seen.

NEUROLOGICAL EXAM: The patient is awake and oriented x3.  Speech and language are

normal.  Strength is full in all 4 extremities.  Sensory exam showed diminished light

touch sensation in bilateral distal lower extremities.  No facial asymmetry is seen on

cranial nerve testing.  No tremors or seizure-like activity is seen.



IMPRESSION:

1. Intractable headache.

2. Pneumonia.

3. Chronic low back pain.

4. Lower extremity sensory deficit.

5. Diabetes.



RECOMMENDATION:

The patient continues to complain of a pressure headache mainly in the bilateral

frontal region.  He denies any previous history of headaches and started having these

headaches several weeks ago given his current age, intracranial etiology needs to be

ruled out.  I will order an MRI of the brain with and without contrast. For his current

pain, I will give him 3 doses of IV Solu-Medrol at 125 mg every 8 hours.  I will also

give him Reglan 10 mg IV every 8 hours and magnesium sulfate 1 gram as a single dose.

An EEG has been ordered.  As for his sensory deficit, he will need further outpatient

neurophysiological workup as he likely has diabetic peripheral polyneuropathy.  I

discussed with him treatment options regarding his low back pain and this will be

discussed further in outpatient clinic.  Continue the rest of your current workup and

management.  I will continue to follow with you.  Further recommendations to follow.



Thank you for allowing me to participate in the care of your patient.  If you have any

questions, please feel free to contact me.





MMTRACEE / IJN: [id number] / Job#: [id number]

## 2018-12-11 NOTE — EEG
ELECTROENCEPHALOGRAM REPORT



DATE OF SERVICE:

12/11/2018



REASON FOR TESTING:

Headache and dizziness.



DESCRIPTION OF THE PROCEDURE:

This EEG was performed using a 21-channel digital electroencephalograph, following

international 10-20 system.



DESCRIPTION OF THE RECORDING:

From the beginning of the tracing, and with the patient's eyes closed, the background

rhythm was mostly consisting of 8 Hz alpha frequency in the posterior occipital leads.

No obvious asymmetry is seen.  Occasional movement artifacts and lead artifacts are

seen.  Photic stimulation was performed with a minimal driving response seen.  No

pathological waves were elicited.  Hyperventilation was not performed.  The patient

remains awake throughout the tracing.  No epileptiform discharges were seen.  His EKG

lead showed a regular rate and rhythm.



INTERPRETATION:

This awake EEG can be considered within normal limits.  There was no asymmetry seen.

No epileptiform discharges were noticed.  The absence of epileptiform discharges does

not rule out the diagnosis of epilepsy; therefore clinical correlation is recommended.





MMTRACEE / WEIN: [id number] / Job#: [id number]

## 2018-12-11 NOTE — P.PN
Subjective


Progress Note Date: 12/11/18





This is a 73-year-old male, patient of [facility].  Patient has a 

known past medical history of COPD, diabetes mellitus type 2, GERD, hypertension

, vertebral fractures, nicotine dependence and hypothyroidism.  Patient 

presents to the emergency room with 3 day complaint of nonproductive cough and 

worsening shortness of breath with no improvement.  Chest x-ray showed 

bilateral lower lobe atelectasis.  He did have elevated white count 12.3 temp 

as high as 102.5 and was tachycardic on admission.  Influenza screen was 

negative.  He was started on Rocephin and azithromycin for a possible 

developing pneumonia.  Was given 1 dose of IV Solu-Medrol in the ER.  Pulmonary 

service has been placed on consult.  Patient also complaining of a headache.  

Patient reports that his been dealing with frequent headaches since November.  

He is followed up with neurology in the outpatient setting he had a computed 

tomography scan of the brain in November with no acute changes.  And he is 

recently been started on Depakote 3 days ago.  Patient is still complaining of 

a headache.  Did have some improvement with the Norco.  Patient denies any 

chest pain, nausea or vomiting, bowel movement changes or urinary symptoms.  He 

did have elevated blood sugar in the 500s likely related to steroids this polyp 

as well as eating the neighboring patients burrito.  Patient is been educated 

to follow diabetic diet.





12/11/18 patient reports improvement in his shortness of breath.  Still having 

slight cough.  He's been afebrile.  IV site medical started by neurology in 

regards to patient's headache.  MRI of the brain revealing mild atrophy, 

moderate periventricular white matter changes.  Consider demyelination disease 

and chronic small vessel ischemic changes.  And mild sinusitis.  Patient doesn'

t port some improvement in his headache with the IV Solu-Medrol.  However, 

patient reports the headache is still persisting.  Blood sugars are elevated in 

the 300s.  NovoLog extra 5 units was given.  If remains elevated patient will 

need to be placed on a insulin drip.  Patient denies any chest pain.  Reports 

having bowel movements.  Denies any burning with urination.





Objective





- Vital Signs


Vital signs: 


 Vital Signs











Temp  98.9 F   12/11/18 05:00


 


Pulse  84   12/11/18 08:01


 


Resp  16   12/11/18 05:00


 


BP  154/73   12/11/18 05:00


 


Pulse Ox  93 L  12/11/18 05:00








 Intake & Output











 12/10/18 12/11/18 12/11/18





 18:59 06:59 18:59


 


Intake Total 2080 1300 


 


Balance 2080 1300 


 


Weight 83 kg  


 


Intake:   


 


  Intake, IV Titration 900 800 





  Amount   


 


    Magnesium Sulfate-D5w Pmx  100 





    1 gm In Dextrose/Water 1   





    100ml.bag @ 100 mls/hr   





    IVPB ONCE ONE Rx#:   





    [id number]   


 


    Sodium Chloride 0.9% 1, 900 650 





    000 ml @ 75 mls/hr IV .   





    [id number] [facility] Rx#:[id number]   


 


    cefTRIAXone 1,000 mg In  50 





    Sodium Chloride 0.9% 50   





    ml @ 100 mls/hr IVPB Q24H   





    [facility] Rx#:[id number]   


 


  Oral 1180 500 


 


Other:   


 


  Voiding Method Toilet Toilet Toilet


 


  # Voids 3 1 














- Exam





Head normocephalic


Neck supple


Lungs a few coarse breath sounds and diminished bilaterally


Heart regular rate and rhythm S1-S2, no rub or gallop


Abdomen is soft nontender nondistended positive bowel sounds no 

hepatosplenomegaly


Extremities no edema


Neuro alert and orientated to 3





- Labs


CBC & Chem 7: 


 12/11/18 09:02





 12/11/18 09:02


Labs: 


 Abnormal Lab Results - Last 24 Hours (Table)











  12/10/18 12/10/18 12/10/18 Range/Units





  11:22 11:22 11:22 


 


WBC  12.9 H    (3.8-10.6)  k/uL


 


RBC  3.89 L    (4.30-5.90)  m/uL


 


Hgb  11.6 L    (13.0-17.5)  gm/dL


 


Hct  35.4 L    (39.0-53.0)  %


 


Neutrophils #  11.6 H    (1.3-7.7)  k/uL


 


Lymphocytes #  0.8 L    (1.0-4.8)  k/uL


 


Chloride   108 H   ()  mmol/L


 


Carbon Dioxide   20 L   (22-30)  mmol/L


 


BUN   21 H   (9-20)  mg/dL


 


Glucose   429 H   (74-99)  mg/dL


 


POC Glucose (mg/dL)     (75-99)  mg/dL


 


Hemoglobin A1c    9.1 H  (4.0-6.0)  %














  12/10/18 12/10/18 12/10/18 Range/Units





  11:26 17:15 21:13 


 


WBC     (3.8-10.6)  k/uL


 


RBC     (4.30-5.90)  m/uL


 


Hgb     (13.0-17.5)  gm/dL


 


Hct     (39.0-53.0)  %


 


Neutrophils #     (1.3-7.7)  k/uL


 


Lymphocytes #     (1.0-4.8)  k/uL


 


Chloride     ()  mmol/L


 


Carbon Dioxide     (22-30)  mmol/L


 


BUN     (9-20)  mg/dL


 


Glucose     (74-99)  mg/dL


 


POC Glucose (mg/dL)  412 H  372 H  329 H  (75-99)  mg/dL


 


Hemoglobin A1c     (4.0-6.0)  %














  12/11/18 12/11/18 12/11/18 Range/Units





  07:04 09:02 09:02 


 


WBC   17.4 H   (3.8-10.6)  k/uL


 


RBC   3.98 L   (4.30-5.90)  m/uL


 


Hgb   12.0 L   (13.0-17.5)  gm/dL


 


Hct   36.7 L   (39.0-53.0)  %


 


Neutrophils #   15.1 H   (1.3-7.7)  k/uL


 


Lymphocytes #     (1.0-4.8)  k/uL


 


Chloride     ()  mmol/L


 


Carbon Dioxide     (22-30)  mmol/L


 


BUN    21 H  (9-20)  mg/dL


 


Glucose    266 H  (74-99)  mg/dL


 


POC Glucose (mg/dL)  304 H    (75-99)  mg/dL


 


Hemoglobin A1c     (4.0-6.0)  %














  12/11/18 Range/Units





  11:08 


 


WBC   (3.8-10.6)  k/uL


 


RBC   (4.30-5.90)  m/uL


 


Hgb   (13.0-17.5)  gm/dL


 


Hct   (39.0-53.0)  %


 


Neutrophils #   (1.3-7.7)  k/uL


 


Lymphocytes #   (1.0-4.8)  k/uL


 


Chloride   ()  mmol/L


 


Carbon Dioxide   (22-30)  mmol/L


 


BUN   (9-20)  mg/dL


 


Glucose   (74-99)  mg/dL


 


POC Glucose (mg/dL)  229 H  (75-99)  mg/dL


 


Hemoglobin A1c   (4.0-6.0)  %








 Microbiology - Last 24 Hours (Table)











 12/09/18 23:40 Blood Culture - Preliminary





 Blood    No Growth after 24 hours














Assessment and Plan


Assessment: 





1.  Acute COPD exacerbation: Continue nebulizer treatments and prednisone.  

Patient did receive 1 dose of IV Solu-Medrol in the ER.  Await pulmonary 

evaluation





2.  Possible developing community-acquired pneumonia: Chest x-ray showing 

bilateral lower lobe atelectasis.  Patient currently on Rocephin and 

azithromycin.  Consult pulmonary service.  Add incentive spirometer.  Add 

Mucinex





3.  Sepsis present on admission with temporal 102.5, tachycardic and elevated 

white count 12.3.  Influenza screen negative.  Likely related to a possible 

developing pneumonia.  Continue antibiotics.  Continue IV fluids.  Check blood 

culture.





4.  Hypomagnesemia: Improved with supplement.  Repeat magnesium 2.1





5.  Nicotine dependence: Discussed smoking cessation greater than 3 minutes.  

add nicotine patch





6.  Migraine headaches: Recently started on Depakote about 3 days ago by Dr. Chapa.  Computed tomography scan of the brain completed in November 2018 

showing no acute changes.  Did reveal chronic small vessel ischemic changes and 

age-related atrophy.  Patient still complaining of headaches.  Did have some 

improvement with the Norco.  Patient evaluated by neurology.  They have ordered 

3 doses of IV Solu-Medrol and IV magnesium.  MRI of the brain showing mild 

atrophy and moderate periventricular white matter changes.  Await further 

neurology recommendations.





7.  Essential hypertension.  Blood pressure stable





8.  Hypothyroidism continue Synthroid





9.  History of vertebral fractures and chronic back pain





10. Diabetes type 2: resume metformin and add sliding scale.   Hyperglycemia 

secondary to steroids.  Patient receiving a dose of NovoLog 5 units.  We'll 

monitor.  If blood sugars remain elevated.  Patient may require insulin drip.





11.  Mild sinusitis.  Continue antibiotics





Consult physical therapy





GI prophylaxis Pepcid and DVT prophylaxis Lovenox

## 2018-12-11 NOTE — P.PN
Subjective


Progress Note Date: 12/11/18


Principal diagnosis: 





Patient is a pleasant 73-year-old male who is being followed by the neurology 

service for intractable headache.  Patient reports he saw Dr. Chapa on Friday 

and was given Depakote 250 mg 3 times a day.  Patient states no improvement in 

headache with Depakote.  Patient became short of breath the following day and 

came to [facility] for evaluation.  Patient does not have any 

history of migraine headaches.  Patient had MRI of the brain done which showed 

mild atrophy.  MRI of the brain also showed white matter changes.  No acute 

finding.  Patient has been receiving IV Solu-Medrol, Reglan and had a one-time 

dose of magnesium.  Patient states this did help his headache.  He reports his 

headache is back at this time.  His blood pressure has been elevated which may 

be contributing to his headache.  At the time of my evaluation, patient's 

resting comfortably in bed and appears to be in no acute distress.





Objective





- Vital Signs


Vital signs: 


 Vital Signs











Temp  97.5 F L  12/11/18 12:23


 


Pulse  87   12/11/18 12:23


 


Resp  16   12/11/18 12:23


 


BP  162/83   12/11/18 12:23


 


Pulse Ox  91 L  12/11/18 12:23








 Intake & Output











 12/10/18 12/11/18 12/11/18





 18:59 06:59 18:59


 


Intake Total 2080 1300 600


 


Balance 2080 1300 600


 


Weight 83 kg  83 kg


 


Intake:   


 


  Intake, IV Titration 900 800 600





  Amount   


 


    Magnesium Sulfate-D5w Pmx  100 





    1 gm In Dextrose/Water 1   





    100ml.bag @ 100 mls/hr   





    IVPB ONCE ONE Rx#:   





    [id number]   


 


    Sodium Chloride 0.9% 1, 900 650 600





    000 ml @ 75 mls/hr IV .   





    [id number] [facility] Rx#:[id number]   


 


    cefTRIAXone 1,000 mg In  50 





    Sodium Chloride 0.9% 50   





    ml @ 100 mls/hr IVPB Q24H   





    [facility] Rx#:[id number]   


 


  Oral 1180 500 


 


Other:   


 


  Voiding Method Toilet Toilet Toilet


 


  # Voids 3 1 














- Exam





PHYSICAL EXAM:





GENERAL APPEARANCE: Patient is a well-developed, male who appears to be in no 

acute distress.





HEENT: Normocephalic, atraumatic, no facial asymmetry is seen.  Neck is supple 

with no masses felt.





CARDIOVASCULAR: Regular rate and rhythm.





ABDOMEN: Nontender, nondistended.





EXTREMITIES: Show no edema or clubbing.





NEUROLOGICAL EXAM: Patient is awake, alert, and oriented 3.  Speech and 

language are normal.  Strength is full in all 4 extremities.  Sensory deficit 

noted to bilateral distal lower extremities.  No facial asymmetry is seen on 

cranial nerve testing.  No tremors or seizure-like activity noted.





- Labs


CBC & Chem 7: 


 12/11/18 09:02





 12/11/18 09:02


Labs: 


 Abnormal Lab Results - Last 24 Hours (Table)











  12/10/18 12/10/18 12/10/18 Range/Units





  11:22 17:15 21:13 


 


WBC     (3.8-10.6)  k/uL


 


RBC     (4.30-5.90)  m/uL


 


Hgb     (13.0-17.5)  gm/dL


 


Hct     (39.0-53.0)  %


 


Neutrophils #     (1.3-7.7)  k/uL


 


BUN     (9-20)  mg/dL


 


Glucose     (74-99)  mg/dL


 


POC Glucose (mg/dL)   372 H  329 H  (75-99)  mg/dL


 


Hemoglobin A1c  9.1 H    (4.0-6.0)  %














  12/11/18 12/11/18 12/11/18 Range/Units





  07:04 09:02 09:02 


 


WBC   17.4 H   (3.8-10.6)  k/uL


 


RBC   3.98 L   (4.30-5.90)  m/uL


 


Hgb   12.0 L   (13.0-17.5)  gm/dL


 


Hct   36.7 L   (39.0-53.0)  %


 


Neutrophils #   15.1 H   (1.3-7.7)  k/uL


 


BUN    21 H  (9-20)  mg/dL


 


Glucose    266 H  (74-99)  mg/dL


 


POC Glucose (mg/dL)  304 H    (75-99)  mg/dL


 


Hemoglobin A1c     (4.0-6.0)  %














  12/11/18 Range/Units





  11:08 


 


WBC   (3.8-10.6)  k/uL


 


RBC   (4.30-5.90)  m/uL


 


Hgb   (13.0-17.5)  gm/dL


 


Hct   (39.0-53.0)  %


 


Neutrophils #   (1.3-7.7)  k/uL


 


BUN   (9-20)  mg/dL


 


Glucose   (74-99)  mg/dL


 


POC Glucose (mg/dL)  229 H  (75-99)  mg/dL


 


Hemoglobin A1c   (4.0-6.0)  %








 Microbiology - Last 24 Hours (Table)











 12/09/18 23:40 Blood Culture - Preliminary





 Blood    No Growth after 24 hours














Assessment and Plan


Plan: 





Impression:


1.  Intractable headache


2.  Pneumonia


3.  Chronic low back pain


4.  Lower extremity sensory deficit


5.  Diabetes





Recommendation: The patient continues to complain of headache which she 

describes as a pressure in his head.  Patient states headache is mostly in the 

frontal area.  Patient states headaches are new for him.  This started in 

November.  As mentioned above, MRI of the brain with and without contrast 

showed no acute abnormality.  MRI of the brain did show small vessel ischemic 

disease or demyelinating disease which is in the differential.  I recommend 

starting aspirin 81 mg for chronic small vessel ischemic disease as this is 

more likely in a patient this age.  EEG was done and results are pending.  As 

for his ongoing headache, I will prescribe Toradol 15 mg IV every 8 hours when 

necessary and Fioricet every 8 hours when necessary.  No focal neurological 

deficit noted.  As for his sensory deficit and low back pain, further workup 

can be performed in the outpatient clinic.  Continue current medical 

management.  I will continue to follow with you.  Further recommendations to 

follow.





I performed an examination of the patient and discussed the management with the 

NP.  I have reviewed the NP notes and agree with the findings and plan of care.

## 2018-12-11 NOTE — P.PN
Subjective


Progress Note Date: 12/11/18


Principal diagnosis: 





Bilateral pneumonia, acute COPD exacerbation, end-stage lung disease in his 

severe COPD emphysema, hypertension hypertensive cardiovascular disease, 

chronic migrainous headaches





12/11/2018, patient seen eval examined during the rounds cuff congestion 

shortness breath is improved respiratory status slightly better with still get 

short of breath and activity and exertion patient is still continue to complain 

of headaches intermittently neurology is evaluating this patient on is for EEG 

later on today





73-year-old male who has end-stage lung disease is severe COPD emphysema with 

complex past medical history associated with type 2 diabetes mellitus GERD 

hypertension a potential cardiovascular disease and disease


The second to severe COPD emphysema patient has been long-term smoker he has 

not been feeling well for the last 3 or 4 days increased cuff congestion 

shortness of breath he was started on Rocephin for Zithromax with some relief 

and improvement but still get short of breath, patient has intermittent 

headaches as well neurology has been following, on specific questioning denies 

any seizure-like to a loss of consciousness hemiparesis, denies any chest pain 

or radiation of pain dye and dry as any bowel or bladder dysfunction





Objective





- Vital Signs


Vital signs: 


 Vital Signs











Temp  97.5 F L  12/11/18 12:23


 


Pulse  80   12/11/18 16:47


 


Resp  16   12/11/18 12:23


 


BP  162/83   12/11/18 12:23


 


Pulse Ox  91 L  12/11/18 12:23








 Intake & Output











 12/10/18 12/11/18 12/11/18





 18:59 06:59 18:59


 


Intake Total 2080 1300 600


 


Balance 2080 1300 600


 


Weight 83 kg  83 kg


 


Intake:   


 


  Intake, IV Titration 900 800 600





  Amount   


 


    Magnesium Sulfate-D5w Pmx  100 





    1 gm In Dextrose/Water 1   





    100ml.bag @ 100 mls/hr   





    IVPB ONCE ONE Rx#:   





    [id number]   


 


    Sodium Chloride 0.9% 1, 900 650 600





    000 ml @ 75 mls/hr IV .   





    [id number][id number]   


 


    cefTRIAXone 1,000 mg In  50 





    Sodium Chloride 0.9% 50   





    ml @ 100 mls/hr IVPB Q24H   





    [facility][id number]   


 


  Oral 1180 500 


 


Other:   


 


  Voiding Method Toilet Toilet Toilet


 


  # Voids 3 1 














- Exam


HEENT head atraumatic normocephalic





Eyes clear without exam neck supple without adenopathy jugular venous 

distention or bruit





Neck supple without adenopathy jugular venous distention carotid bruit, no 

lymphadenopathy





Lungs bilateral respiratory extreme wheezing and rhonchi present





Heart regular rate and rhythm S1-S2 audible 





abdominal soft no rebound rigidity 





extremities +1 pedal pulses 





neurological examination awake alert no focal neurological deficit








- Labs


CBC & Chem 7: 


 12/11/18 09:02





 12/11/18 09:02


Labs: 


 Abnormal Lab Results - Last 24 Hours (Table)











  12/10/18 12/10/18 12/11/18 Range/Units





  11:22 21:13 07:04 


 


WBC     (3.8-10.6)  k/uL


 


RBC     (4.30-5.90)  m/uL


 


Hgb     (13.0-17.5)  gm/dL


 


Hct     (39.0-53.0)  %


 


Neutrophils #     (1.3-7.7)  k/uL


 


BUN     (9-20)  mg/dL


 


Glucose     (74-99)  mg/dL


 


POC Glucose (mg/dL)   329 H  304 H  (75-99)  mg/dL


 


Hemoglobin A1c  9.1 H    (4.0-6.0)  %














  12/11/18 12/11/18 12/11/18 Range/Units





  09:02 09:02 11:08 


 


WBC  17.4 H    (3.8-10.6)  k/uL


 


RBC  3.98 L    (4.30-5.90)  m/uL


 


Hgb  12.0 L    (13.0-17.5)  gm/dL


 


Hct  36.7 L    (39.0-53.0)  %


 


Neutrophils #  15.1 H    (1.3-7.7)  k/uL


 


BUN   21 H   (9-20)  mg/dL


 


Glucose   266 H   (74-99)  mg/dL


 


POC Glucose (mg/dL)    229 H  (75-99)  mg/dL


 


Hemoglobin A1c     (4.0-6.0)  %














  12/11/18 Range/Units





  17:04 


 


WBC   (3.8-10.6)  k/uL


 


RBC   (4.30-5.90)  m/uL


 


Hgb   (13.0-17.5)  gm/dL


 


Hct   (39.0-53.0)  %


 


Neutrophils #   (1.3-7.7)  k/uL


 


BUN   (9-20)  mg/dL


 


Glucose   (74-99)  mg/dL


 


POC Glucose (mg/dL)  297 H  (75-99)  mg/dL


 


Hemoglobin A1c   (4.0-6.0)  %








 Microbiology - Last 24 Hours (Table)











 12/09/18 23:40 Blood Culture - Preliminary





 Blood    No Growth after 24 hours














Assessment and Plan


Assessment: 


Acute COPD exacerbation





Purulent tracheobronchitis





Bilateral lower lobe pneumonia





Sepsis with a spiking fever tachypnea and tachycardia and leukocytosis





Hypomagnesemia





Severe COPD emphysema





Chronic headaches





Plan: 


Broad-spectrum antibiotics





Breathing treatments





IV steroids





Increase activity as tolerated further evaluation outpatient setting





Antibiotics and steroids can be switched to oral


24 hours





Neurological workup for chronic headaches





Time with Patient: Greater than 30

## 2018-12-12 LAB
ALBUMIN SERPL-MCNC: 4.4 G/DL (ref 3.5–5)
ALP SERPL-CCNC: 81 U/L (ref 38–126)
ALT SERPL-CCNC: 30 U/L (ref 21–72)
ANION GAP SERPL CALC-SCNC: 17 MMOL/L
AST SERPL-CCNC: 32 U/L (ref 17–59)
BASOPHILS # BLD AUTO: 0 K/UL (ref 0–0.2)
BASOPHILS NFR BLD AUTO: 0 %
BUN SERPL-SCNC: 25 MG/DL (ref 9–20)
CALCIUM SPEC-MCNC: 10 MG/DL (ref 8.4–10.2)
CHLORIDE SERPL-SCNC: 99 MMOL/L (ref 98–107)
CO2 SERPL-SCNC: 21 MMOL/L (ref 22–30)
EOSINOPHIL # BLD AUTO: 0.6 K/UL (ref 0–0.7)
EOSINOPHIL NFR BLD AUTO: 3 %
ERYTHROCYTE [DISTWIDTH] IN BLOOD BY AUTOMATED COUNT: 4.45 M/UL (ref 4.3–5.9)
ERYTHROCYTE [DISTWIDTH] IN BLOOD: 13.4 % (ref 11.5–15.5)
GLUCOSE BLD-MCNC: 245 MG/DL (ref 75–99)
GLUCOSE BLD-MCNC: 301 MG/DL (ref 75–99)
GLUCOSE BLD-MCNC: 316 MG/DL (ref 75–99)
GLUCOSE BLD-MCNC: 413 MG/DL (ref 75–99)
GLUCOSE BLD-MCNC: 456 MG/DL (ref 75–99)
GLUCOSE BLD-MCNC: 551 MG/DL (ref 75–99)
GLUCOSE BLD-MCNC: 82 MG/DL (ref 75–99)
GLUCOSE SERPL-MCNC: 314 MG/DL (ref 74–99)
HCT VFR BLD AUTO: 40.1 % (ref 39–53)
HGB BLD-MCNC: 12.8 GM/DL (ref 13–17.5)
LYMPHOCYTES # SPEC AUTO: 1.4 K/UL (ref 1–4.8)
LYMPHOCYTES NFR SPEC AUTO: 7 %
MCH RBC QN AUTO: 28.8 PG (ref 25–35)
MCHC RBC AUTO-ENTMCNC: 31.9 G/DL (ref 31–37)
MCV RBC AUTO: 90.2 FL (ref 80–100)
MONOCYTES # BLD AUTO: 0.7 K/UL (ref 0–1)
MONOCYTES NFR BLD AUTO: 3 %
NEUTROPHILS # BLD AUTO: 17.1 K/UL (ref 1.3–7.7)
NEUTROPHILS NFR BLD AUTO: 86 %
PLATELET # BLD AUTO: 305 K/UL (ref 150–450)
POTASSIUM SERPL-SCNC: 5.4 MMOL/L (ref 3.5–5.1)
PROT SERPL-MCNC: 7.4 G/DL (ref 6.3–8.2)
SODIUM SERPL-SCNC: 137 MMOL/L (ref 137–145)
WBC # BLD AUTO: 19.8 K/UL (ref 3.8–10.6)

## 2018-12-12 RX ADMIN — METOCLOPRAMIDE SCH MG: 5 INJECTION, SOLUTION INTRAMUSCULAR; INTRAVENOUS at 17:57

## 2018-12-12 RX ADMIN — INSULIN ASPART SCH UNIT: 100 INJECTION, SOLUTION INTRAVENOUS; SUBCUTANEOUS at 08:00

## 2018-12-12 RX ADMIN — KETOROLAC TROMETHAMINE PRN MG: 30 INJECTION, SOLUTION INTRAMUSCULAR at 17:55

## 2018-12-12 RX ADMIN — METHYLPREDNISOLONE SODIUM SUCCINATE SCH MG: 125 INJECTION, POWDER, FOR SOLUTION INTRAMUSCULAR; INTRAVENOUS at 08:00

## 2018-12-12 RX ADMIN — INSULIN ASPART SCH UNIT: 100 INJECTION, SOLUTION INTRAVENOUS; SUBCUTANEOUS at 11:48

## 2018-12-12 RX ADMIN — METHYLPREDNISOLONE SODIUM SUCCINATE SCH MG: 125 INJECTION, POWDER, FOR SOLUTION INTRAMUSCULAR; INTRAVENOUS at 00:35

## 2018-12-12 RX ADMIN — ENOXAPARIN SODIUM SCH MG: 40 INJECTION SUBCUTANEOUS at 08:00

## 2018-12-12 RX ADMIN — METOCLOPRAMIDE SCH MG: 5 INJECTION, SOLUTION INTRAMUSCULAR; INTRAVENOUS at 08:01

## 2018-12-12 RX ADMIN — DIVALPROEX SODIUM SCH MG: 250 TABLET, DELAYED RELEASE ORAL at 08:03

## 2018-12-12 RX ADMIN — HYDROCODONE BITARTRATE AND ACETAMINOPHEN PRN EACH: 10; 325 TABLET ORAL at 23:48

## 2018-12-12 RX ADMIN — FAMOTIDINE SCH MG: 20 TABLET, FILM COATED ORAL at 08:02

## 2018-12-12 RX ADMIN — METOCLOPRAMIDE SCH: 5 INJECTION, SOLUTION INTRAMUSCULAR; INTRAVENOUS at 03:42

## 2018-12-12 RX ADMIN — NAPROXEN SCH MG: 250 TABLET ORAL at 08:04

## 2018-12-12 RX ADMIN — HYDROCODONE BITARTRATE AND ACETAMINOPHEN PRN EACH: 10; 325 TABLET ORAL at 08:01

## 2018-12-12 RX ADMIN — LOSARTAN POTASSIUM AND HYDROCHLOROTHIAZIDE SCH EACH: 12.5; 5 TABLET ORAL at 08:03

## 2018-12-12 RX ADMIN — ASPIRIN 81 MG CHEWABLE TABLET SCH MG: 81 TABLET CHEWABLE at 08:02

## 2018-12-12 RX ADMIN — DIVALPROEX SODIUM SCH MG: 250 TABLET, DELAYED RELEASE ORAL at 16:00

## 2018-12-12 RX ADMIN — LEVOTHYROXINE SODIUM SCH MCG: 25 TABLET ORAL at 05:55

## 2018-12-12 RX ADMIN — HYDROCODONE BITARTRATE AND ACETAMINOPHEN PRN EACH: 10; 325 TABLET ORAL at 00:33

## 2018-12-12 RX ADMIN — INSULIN ASPART SCH UNIT: 100 INJECTION, SOLUTION INTRAVENOUS; SUBCUTANEOUS at 17:57

## 2018-12-12 RX ADMIN — ISODIUM CHLORIDE SCH MG: 0.03 SOLUTION RESPIRATORY (INHALATION) at 15:40

## 2018-12-12 RX ADMIN — NAPROXEN SCH MG: 250 TABLET ORAL at 22:48

## 2018-12-12 RX ADMIN — ISODIUM CHLORIDE SCH MG: 0.03 SOLUTION RESPIRATORY (INHALATION) at 11:58

## 2018-12-12 RX ADMIN — ISODIUM CHLORIDE SCH MG: 0.03 SOLUTION RESPIRATORY (INHALATION) at 09:18

## 2018-12-12 RX ADMIN — DIVALPROEX SODIUM SCH MG: 250 TABLET, DELAYED RELEASE ORAL at 22:50

## 2018-12-12 RX ADMIN — AZITHROMYCIN SCH MG: 500 TABLET, FILM COATED ORAL at 22:48

## 2018-12-12 RX ADMIN — AZITHROMYCIN SCH MG: 500 TABLET, FILM COATED ORAL at 01:02

## 2018-12-12 RX ADMIN — NICOTINE SCH PATCH: 14 PATCH, EXTENDED RELEASE TRANSDERMAL at 08:01

## 2018-12-12 RX ADMIN — HYDROCODONE BITARTRATE AND ACETAMINOPHEN PRN EACH: 10; 325 TABLET ORAL at 16:01

## 2018-12-12 RX ADMIN — ISODIUM CHLORIDE SCH MG: 0.03 SOLUTION RESPIRATORY (INHALATION) at 20:05

## 2018-12-12 NOTE — P.PN
Subjective


Progress Note Date: 12/12/18


Principal diagnosis: 





Patient is a pleasant 73-year-old male who is being followed by the neurology 

service for intractable headache.  Patient reports he saw Dr. Chapa on Friday 

and was given Depakote 250 mg 3 times a day.  Patient states no improvement in 

headache with Depakote.  Patient became short of breath the following day and 

came to [facility] for evaluation.  Patient does not have any 

history of migraine headaches.  Patient had MRI of the brain done which showed 

mild atrophy.  MRI of the brain also showed white matter changes.  No acute 

finding.  Patient has been receiving IV Solu-Medrol, Reglan and had a one-time 

dose of magnesium.  Patient states this did help his headache.  He reports his 

headache is back at this time.  His blood pressure has been elevated which may 

be contributing to his headache.  At the time of my evaluation, patient's 

resting comfortably in bed and appears to be in no acute distress.





12/12/2018


Patient is a 73-year-old male who is being followed by the neurology service 

for intractable headache.  Patient reports headaches started around the 

beginning of November.  Patient saw Dr. Chapa was put on Depakote 250 mg 3 

times a day with little relief of headache.  Patient is getting IV steroids 

along with when necessary Toradol and Fioricet.  Patient reports slight 

improvement in headache.  MRI of the brain showed no acute abnormality.  EEG 

was normal.  At the time of my evaluation, patient's resting comfortably in bed 

and appears to be in no acute distress.








Objective





- Vital Signs


Vital signs: 


 Vital Signs











Temp  97.8 F   12/12/18 13:00


 


Pulse  82   12/12/18 15:53


 


Resp  18   12/12/18 13:00


 


BP  130/68   12/12/18 13:00


 


Pulse Ox  96   12/12/18 13:00








 Intake & Output











 12/11/18 12/12/18 12/12/18





 18:59 06:59 18:59


 


Intake Total 


 


Balance 


 


Weight 83 kg  


 


Intake:   


 


  Intake, IV Titration 600 860 600





  Amount   


 


    Sodium Chloride 0.9% 1, 600 760 600





    000 ml @ 75 mls/hr IV .   





    [id number] [facility] Rx#:[id number]   


 


    cefTRIAXone 1,000 mg In  100 





    Sodium Chloride 0.9% 50   





    ml @ 100 mls/hr IVPB Q24H   





    [facility] Rx#:[id number]   


 


  Oral   620


 


Other:   


 


  Voiding Method Toilet Toilet Toilet


 


  # Voids   5














- Exam





PHYSICAL EXAM:





GENERAL APPEARANCE: Patient is a well-developed, male who appears to be in no 

acute distress.





HEENT: Normocephalic, atraumatic, no facial asymmetry is seen.  Neck is supple 

with no masses felt.





CARDIOVASCULAR: Regular rate and rhythm.





ABDOMEN: Nontender, nondistended.





EXTREMITIES: Show no edema or clubbing.





NEUROLOGICAL EXAM: Patient is awake, alert, and oriented 3.  Speech and 

language are normal.  Strength is full in all 4 extremities.  Sensory deficit 

noted to bilateral distal lower extremities.  No facial asymmetry is seen on 

cranial nerve testing.  No tremors or seizure-like activity noted.





- Labs


CBC & Chem 7: 


 12/12/18 08:51





 12/12/18 13:57


Labs: 


 Abnormal Lab Results - Last 24 Hours (Table)











  12/11/18 12/12/18 12/12/18 Range/Units





  20:05 07:21 08:51 


 


WBC    19.8 H  (3.8-10.6)  k/uL


 


Hgb    12.8 L  (13.0-17.5)  gm/dL


 


Neutrophils #    17.1 H  (1.3-7.7)  k/uL


 


Potassium     (3.5-5.1)  mmol/L


 


Carbon Dioxide     (22-30)  mmol/L


 


BUN     (9-20)  mg/dL


 


Glucose     (74-99)  mg/dL


 


POC Glucose (mg/dL)  375 H  301 H   (75-99)  mg/dL














  12/12/18 12/12/18 Range/Units





  08:51 11:25 


 


WBC    (3.8-10.6)  k/uL


 


Hgb    (13.0-17.5)  gm/dL


 


Neutrophils #    (1.3-7.7)  k/uL


 


Potassium  5.4 H   (3.5-5.1)  mmol/L


 


Carbon Dioxide  21 L   (22-30)  mmol/L


 


BUN  25 H   (9-20)  mg/dL


 


Glucose  314 H   (74-99)  mg/dL


 


POC Glucose (mg/dL)   316 H  (75-99)  mg/dL








 Microbiology - Last 24 Hours (Table)











 12/09/18 23:40 Blood Culture - Preliminary





 Blood    No Growth after 48 hours














Assessment and Plan


Plan: 





Impression:


1.  Intractable headache


2.  Pneumonia


3.  Chronic low back pain


4.  Lower extremity sensory deficit


5.  Diabetes





Recommendation: The patient continues to complain of headache which he 

describes as a pressure in his head.  Patient states headache is mostly in the 

frontal area.  Patient states headaches are new for him.  This started in 

November.  As mentioned above, MRI of the brain with and without contrast 

showed no acute abnormality.  MRI of the brain did show small vessel ischemic 

disease or demyelinating disease which is in the differential.  I recommend 

starting aspirin 81 mg for chronic small vessel ischemic disease as this is 

more likely in a patient this age.  EEG was normal.  As for his ongoing headache

, he may continue Toradol 15 mg IV every 8 hours when necessary and Fioricet 

every 8 hours when necessary.  If headaches persist, a sphenopalatine block can 

be performed in the office.  No focal neurological deficit noted.  As for his 

sensory deficit and low back pain, further workup can be performed in the 

outpatient clinic.  Continue current medical management.  I will continue to 

follow with you.  Further recommendations to follow.





I performed an examination of the patient and discussed the management with the 

NP.  I have reviewed the NP notes and agree with the findings and plan of care.

## 2018-12-12 NOTE — P.PN
Subjective


Progress Note Date: 12/12/18





This is a 73-year-old male, patient of [facility].  Patient has a 

known past medical history of COPD, diabetes mellitus type 2, GERD, hypertension

, vertebral fractures, nicotine dependence and hypothyroidism.  Patient 

presents to the emergency room with 3 day complaint of nonproductive cough and 

worsening shortness of breath with no improvement.  Chest x-ray showed 

bilateral lower lobe atelectasis.  He did have elevated white count 12.3 temp 

as high as 102.5 and was tachycardic on admission.  Influenza screen was 

negative.  He was started on Rocephin and azithromycin for a possible 

developing pneumonia.  Was given 1 dose of IV Solu-Medrol in the ER.  Pulmonary 

service has been placed on consult.  Patient also complaining of a headache.  

Patient reports that his been dealing with frequent headaches since November.  

He is followed up with neurology in the outpatient setting he had a computed 

tomography scan of the brain in November with no acute changes.  And he is 

recently been started on Depakote 3 days ago.  Patient is still complaining of 

a headache.  Did have some improvement with the Norco.  Patient denies any 

chest pain, nausea or vomiting, bowel movement changes or urinary symptoms.  He 

did have elevated blood sugar in the 500s likely related to steroids this polyp 

as well as eating the neighboring patients burrito.  Patient is been educated 

to follow diabetic diet.





12/11/18 patient reports improvement in his shortness of breath.  Still having 

slight cough.  He's been afebrile.  IV site medical started by neurology in 

regards to patient's headache.  MRI of the brain revealing mild atrophy, 

moderate periventricular white matter changes.  Consider demyelination disease 

and chronic small vessel ischemic changes.  And mild sinusitis.  Patient doesn'

t port some improvement in his headache with the IV Solu-Medrol.  However, 

patient reports the headache is still persisting.  Blood sugars are elevated in 

the 300s.  NovoLog extra 5 units was given.  If remains elevated patient will 

need to be placed on a insulin drip.  Patient denies any chest pain.  Reports 

having bowel movements.  Denies any burning with urination.








On 12/12/2018 patient is currently resting in bed.  Patient does report some 

improvement shortness of breath still has cough.  Patient remains afebrile.  

Patient remains on IV Solu-Medrol for migraine headache.  This time patient 

denies chest pain or shortness of breath.  Patient denies nausea vomiting or 

diarrhea.  Patient denies any urinary burning or frequency








Objective





- Vital Signs


Vital signs: 


 Vital Signs











Temp  98.1 F   12/12/18 05:00


 


Pulse  78   12/12/18 09:33


 


Resp  18   12/12/18 05:00


 


BP  169/84   12/12/18 05:00


 


Pulse Ox  91 L  12/12/18 05:00








 Intake & Output











 12/11/18 12/12/18 12/12/18





 18:59 06:59 18:59


 


Intake Total 600 860 


 


Balance 600 860 


 


Weight 83 kg  


 


Intake:   


 


  Intake, IV Titration 600 860 





  Amount   


 


    Sodium Chloride 0.9% 1, 600 760 





    000 ml @ 75 mls/hr IV .   





    [id number] [facility] Rx#:[id number]   


 


    cefTRIAXone 1,000 mg In  100 





    Sodium Chloride 0.9% 50   





    ml @ 100 mls/hr IVPB Q24H   





    [facility] Rx#:[id number]   


 


Other:   


 


  Voiding Method Toilet Toilet 














- Exam


Head normocephalic


Neck supple


Lungs a few coarse breath sounds and diminished bilaterally


Heart regular rate and rhythm S1-S2, no rub or gallop


Abdomen is soft nontender nondistended positive bowel sounds no 

hepatosplenomegaly


Extremities no edema


Neuro alert and orientated to 3








- Labs


CBC & Chem 7: 


 12/12/18 08:51





 12/12/18 08:51


Labs: 


 Abnormal Lab Results - Last 24 Hours (Table)











  12/11/18 12/11/18 12/11/18 Range/Units





  11:08 17:04 20:05 


 


WBC     (3.8-10.6)  k/uL


 


Hgb     (13.0-17.5)  gm/dL


 


Neutrophils #     (1.3-7.7)  k/uL


 


Potassium     (3.5-5.1)  mmol/L


 


Carbon Dioxide     (22-30)  mmol/L


 


BUN     (9-20)  mg/dL


 


Glucose     (74-99)  mg/dL


 


POC Glucose (mg/dL)  229 H  297 H  375 H  (75-99)  mg/dL














  12/12/18 12/12/18 12/12/18 Range/Units





  07:21 08:51 08:51 


 


WBC   19.8 H   (3.8-10.6)  k/uL


 


Hgb   12.8 L   (13.0-17.5)  gm/dL


 


Neutrophils #   17.1 H   (1.3-7.7)  k/uL


 


Potassium    5.4 H  (3.5-5.1)  mmol/L


 


Carbon Dioxide    21 L  (22-30)  mmol/L


 


BUN    25 H  (9-20)  mg/dL


 


Glucose    314 H  (74-99)  mg/dL


 


POC Glucose (mg/dL)  301 H    (75-99)  mg/dL








 Microbiology - Last 24 Hours (Table)











 12/09/18 23:40 Blood Culture - Preliminary





 Blood    No Growth after 48 hours














Assessment and Plan


Assessment: 


1.  Acute COPD exacerbation: Continue nebulizer treatments and prednisone.  

Patient did receive 1 dose of IV Solu-Medrol in the ER.  Per pulmonary services 

continue breathing treatments and antibiotics





2.  Possible developing community-acquired pneumonia: Chest x-ray showing 

bilateral lower lobe atelectasis.  Patient currently on Rocephin and 

azithromycin.  Consult pulmonary service.  Add incentive spirometer.  Add 

Mucinex





3.  Sepsis present on admission with temporal 102.5, tachycardic and elevated 

white count 12.3.  Influenza screen negative.  Likely related to a possible 

developing pneumonia.  Continue antibiotics.  Continue IV fluids.  Check blood 

culture.





4.  Hypomagnesemia: Improved with supplement.  Repeat magnesium 2.1





5.  Nicotine dependence: Discussed smoking cessation greater than 3 minutes.  

add nicotine patch





6.  Migraine headaches: Recently started on Depakote about 3 days ago by Dr. Chapa.  Computed tomography scan of the brain completed in November 2018 

showing no acute changes.  Did reveal chronic small vessel ischemic changes and 

age-related atrophy.  Patient still complaining of headaches.  Did have some 

improvement with the Norco.  Patient evaluated by neurology.  They have ordered 

3 doses of IV Solu-Medrol and IV magnesium.  MRI of the brain showing mild 

atrophy and moderate periventricular white matter changes.  Per neurology MRI 

of brain did show small vessel ischemic disease or demyelinating disease which 

is in the differential patient started aspirin 81 mg patient also started on 

Toradol and fioricet.





7.  Essential hypertension.  Blood pressure stable





8.  Hypothyroidism continue Synthroid





9.  History of vertebral fractures and chronic back pain





10. Diabetes type 2: resume metformin and add sliding scale.   Hyperglycemia 

secondary to steroids.  Patient receiving a dose of NovoLog 5 units.  We'll 

monitor.  If blood sugars remain elevated.  Patient may require insulin drip.





11.  Mild sinusitis.  Continue antibiotics





12.  Hyperkalemia potassium 5.4.  Kayexalate will be ordered recheck from 1300





Consult physical therapy





GI prophylaxis Pepcid and DVT prophylaxis Lovenox








I performed an examination of the patient and discussed their management with 

the Nurse Practitioner.  I have reviewed the Nurse Practitioner's notes and 

agree with the documented findings and plan of care

## 2018-12-13 VITALS — TEMPERATURE: 98 F | RESPIRATION RATE: 16 BRPM | DIASTOLIC BLOOD PRESSURE: 83 MMHG | SYSTOLIC BLOOD PRESSURE: 138 MMHG

## 2018-12-13 VITALS — HEART RATE: 80 BPM

## 2018-12-13 LAB
ALBUMIN SERPL-MCNC: 4 G/DL (ref 3.5–5)
ALP SERPL-CCNC: 65 U/L (ref 38–126)
ALT SERPL-CCNC: 31 U/L (ref 21–72)
ANION GAP SERPL CALC-SCNC: 11 MMOL/L
AST SERPL-CCNC: 23 U/L (ref 17–59)
BASOPHILS # BLD AUTO: 0 K/UL (ref 0–0.2)
BASOPHILS NFR BLD AUTO: 0 %
BUN SERPL-SCNC: 31 MG/DL (ref 9–20)
CALCIUM SPEC-MCNC: 9.7 MG/DL (ref 8.4–10.2)
CHLORIDE SERPL-SCNC: 100 MMOL/L (ref 98–107)
CO2 SERPL-SCNC: 28 MMOL/L (ref 22–30)
EOSINOPHIL # BLD AUTO: 0.1 K/UL (ref 0–0.7)
EOSINOPHIL NFR BLD AUTO: 0 %
ERYTHROCYTE [DISTWIDTH] IN BLOOD BY AUTOMATED COUNT: 4.43 M/UL (ref 4.3–5.9)
ERYTHROCYTE [DISTWIDTH] IN BLOOD: 13.5 % (ref 11.5–15.5)
GLUCOSE BLD-MCNC: 102 MG/DL (ref 75–99)
GLUCOSE BLD-MCNC: 133 MG/DL (ref 75–99)
GLUCOSE BLD-MCNC: 136 MG/DL (ref 75–99)
GLUCOSE BLD-MCNC: 165 MG/DL (ref 75–99)
GLUCOSE BLD-MCNC: 165 MG/DL (ref 75–99)
GLUCOSE BLD-MCNC: 231 MG/DL (ref 75–99)
GLUCOSE BLD-MCNC: 91 MG/DL (ref 75–99)
GLUCOSE SERPL-MCNC: 107 MG/DL (ref 74–99)
HCT VFR BLD AUTO: 39.6 % (ref 39–53)
HGB BLD-MCNC: 12.9 GM/DL (ref 13–17.5)
LYMPHOCYTES # SPEC AUTO: 2.6 K/UL (ref 1–4.8)
LYMPHOCYTES NFR SPEC AUTO: 16 %
MCH RBC QN AUTO: 29.1 PG (ref 25–35)
MCHC RBC AUTO-ENTMCNC: 32.6 G/DL (ref 31–37)
MCV RBC AUTO: 89.4 FL (ref 80–100)
MONOCYTES # BLD AUTO: 1.3 K/UL (ref 0–1)
MONOCYTES NFR BLD AUTO: 8 %
NEUTROPHILS # BLD AUTO: 11.9 K/UL (ref 1.3–7.7)
NEUTROPHILS NFR BLD AUTO: 74 %
PLATELET # BLD AUTO: 277 K/UL (ref 150–450)
POTASSIUM SERPL-SCNC: 4.2 MMOL/L (ref 3.5–5.1)
PROT SERPL-MCNC: 7 G/DL (ref 6.3–8.2)
SODIUM SERPL-SCNC: 139 MMOL/L (ref 137–145)
WBC # BLD AUTO: 16.1 K/UL (ref 3.8–10.6)

## 2018-12-13 RX ADMIN — NAPROXEN SCH MG: 250 TABLET ORAL at 08:14

## 2018-12-13 RX ADMIN — ASPIRIN 81 MG CHEWABLE TABLET SCH MG: 81 TABLET CHEWABLE at 08:14

## 2018-12-13 RX ADMIN — CEFAZOLIN SCH: 330 INJECTION, POWDER, FOR SOLUTION INTRAMUSCULAR; INTRAVENOUS at 08:11

## 2018-12-13 RX ADMIN — CEFAZOLIN SCH MLS/HR: 330 INJECTION, POWDER, FOR SOLUTION INTRAMUSCULAR; INTRAVENOUS at 08:20

## 2018-12-13 RX ADMIN — METOCLOPRAMIDE SCH MG: 5 INJECTION, SOLUTION INTRAMUSCULAR; INTRAVENOUS at 08:20

## 2018-12-13 RX ADMIN — LOSARTAN POTASSIUM AND HYDROCHLOROTHIAZIDE SCH EACH: 12.5; 5 TABLET ORAL at 08:14

## 2018-12-13 RX ADMIN — DIVALPROEX SODIUM SCH MG: 250 TABLET, DELAYED RELEASE ORAL at 08:14

## 2018-12-13 RX ADMIN — LEVOTHYROXINE SODIUM SCH MCG: 25 TABLET ORAL at 06:22

## 2018-12-13 RX ADMIN — NICOTINE SCH PATCH: 14 PATCH, EXTENDED RELEASE TRANSDERMAL at 08:16

## 2018-12-13 RX ADMIN — HYDROCODONE BITARTRATE AND ACETAMINOPHEN PRN EACH: 10; 325 TABLET ORAL at 08:32

## 2018-12-13 RX ADMIN — ENOXAPARIN SODIUM SCH MG: 40 INJECTION SUBCUTANEOUS at 08:16

## 2018-12-13 RX ADMIN — ISODIUM CHLORIDE SCH MG: 0.03 SOLUTION RESPIRATORY (INHALATION) at 11:32

## 2018-12-13 RX ADMIN — ISODIUM CHLORIDE SCH MG: 0.03 SOLUTION RESPIRATORY (INHALATION) at 07:13

## 2018-12-13 RX ADMIN — FAMOTIDINE SCH MG: 20 TABLET, FILM COATED ORAL at 08:16

## 2018-12-13 RX ADMIN — METOCLOPRAMIDE SCH MG: 5 INJECTION, SOLUTION INTRAMUSCULAR; INTRAVENOUS at 02:54

## 2018-12-13 NOTE — P.PN
Subjective


Progress Note Date: 12/13/18


Principal diagnosis: 





Bilateral pneumonia, acute COPD exacerbation, end-stage lung disease in his 

severe COPD emphysema, hypertension hypertensive cardiovascular disease, 

chronic migrainous headaches


12/13/2018, patient seen eval examined during rounds clinically patient has 

been doing slightly better but is still short of breath still of ongoing cough 

congestion but severity has improved her IV steroids being switched to oral 

care plan discussed with the primary service likely will be discharged home 

later on today, patient has been emphasized about outpatient follow-up





12/12/2018, patient seen eval examined during the rounds clinically patient has 

been doing slightly better in terms of breathing but is still of ongoing cough 

congestion shortness of breath, neurology is evaluating patient for migraine 

headache, patient has been gently rehydrated also getting IV steroids for COPD 

exacerbation





12/11/2018, patient seen eval examined during the rounds cuff congestion 

shortness breath is improved respiratory status slightly better with still get 

short of breath and activity and exertion patient is still continue to complain 

of headaches intermittently neurology is evaluating this patient on is for EEG 

later on today





73-year-old male who has end-stage lung disease is severe COPD emphysema with 

complex past medical history associated with type 2 diabetes mellitus GERD 

hypertension a potential cardiovascular disease and disease


The second to severe COPD emphysema patient has been long-term smoker he has 

not been feeling well for the last 3 or 4 days increased cuff congestion 

shortness of breath he was started on Rocephin for Zithromax with some relief 

and improvement but still get short of breath, patient has intermittent 

headaches as well neurology has been following, on specific questioning denies 

any seizure-like to a loss of consciousness hemiparesis, denies any chest pain 

or radiation of pain dye and dry as any bowel or bladder dysfunction





Objective





- Vital Signs


Vital signs: 


 Vital Signs











Temp  98.0 F   12/13/18 04:22


 


Pulse  80   12/13/18 11:43


 


Resp  16   12/13/18 08:00


 


BP  138/83   12/13/18 04:22


 


Pulse Ox  93 L  12/13/18 04:22








 Intake & Output











 12/12/18 12/13/18 12/13/18





 18:59 06:59 18:59


 


Intake Total 1220 606.421 9087.985


 


Balance 1220 339.114 4878.985


 


Weight  83 kg 


 


Intake:   


 


  Intake, IV Titration 600 691.518 612.985





  Amount   


 


    Insulin Regular 100 unit  41.518 12.985





    In Sodium Chloride 0.9%   





    100 ml @ Titrate IV .Q0M   





    [facility] Rx#:[id number]   


 


    Sodium Chloride 0.9% 1, 600 600 600





    000 ml @ 75 mls/hr IV .   





    [id number] [facility] Rx#:[id number]   


 


    cefTRIAXone 1,000 mg In  50 





    Sodium Chloride 0.9% 50   





    ml @ 100 mls/hr IVPB Q24H   





    [facility] Rx#:[id number]   


 


  Oral 620  1200


 


Other:   


 


  Voiding Method Toilet Toilet Toilet


 


  # Voids 5 3 4














- Exam


HEENT head atraumatic normocephalic





Eyes clear without exam neck supple without adenopathy jugular venous 

distention or bruit





Neck supple without adenopathy jugular venous distention carotid bruit, no 

lymphadenopathy





Lungs bilateral respiratory extreme wheezing and rhonchi present





Heart regular rate and rhythm S1-S2 audible 





abdominal soft no rebound rigidity 





extremities +1 pedal pulses 





neurological examination awake alert no focal neurological deficit








- Labs


CBC & Chem 7: 


 12/13/18 07:30





 12/13/18 07:30


Labs: 


 Abnormal Lab Results - Last 24 Hours (Table)











  12/12/18 12/12/18 12/12/18 Range/Units





  17:26 19:28 20:04 


 


WBC     (3.8-10.6)  k/uL


 


Hgb     (13.0-17.5)  gm/dL


 


Neutrophils #     (1.3-7.7)  k/uL


 


Monocytes #     (0-1.0)  k/uL


 


BUN     (9-20)  mg/dL


 


Glucose     (74-99)  mg/dL


 


POC Glucose (mg/dL)  413 H  456 H  551 H  (75-99)  mg/dL














  12/12/18 12/13/18 12/13/18 Range/Units





  22:07 00:57 01:59 


 


WBC     (3.8-10.6)  k/uL


 


Hgb     (13.0-17.5)  gm/dL


 


Neutrophils #     (1.3-7.7)  k/uL


 


Monocytes #     (0-1.0)  k/uL


 


BUN     (9-20)  mg/dL


 


Glucose     (74-99)  mg/dL


 


POC Glucose (mg/dL)  245 H  102 H  165 H  (75-99)  mg/dL














  12/13/18 12/13/18 12/13/18 Range/Units





  04:17 05:57 07:30 


 


WBC    16.1 H  (3.8-10.6)  k/uL


 


Hgb    12.9 L  (13.0-17.5)  gm/dL


 


Neutrophils #    11.9 H  (1.3-7.7)  k/uL


 


Monocytes #    1.3 H  (0-1.0)  k/uL


 


BUN     (9-20)  mg/dL


 


Glucose     (74-99)  mg/dL


 


POC Glucose (mg/dL)  133 H  136 H   (75-99)  mg/dL














  12/13/18 12/13/18 12/13/18 Range/Units





  07:30 08:24 09:58 


 


WBC     (3.8-10.6)  k/uL


 


Hgb     (13.0-17.5)  gm/dL


 


Neutrophils #     (1.3-7.7)  k/uL


 


Monocytes #     (0-1.0)  k/uL


 


BUN  31 H    (9-20)  mg/dL


 


Glucose  107 H    (74-99)  mg/dL


 


POC Glucose (mg/dL)   231 H  165 H  (75-99)  mg/dL








 Microbiology - Last 24 Hours (Table)











 12/09/18 23:40 Blood Culture - Preliminary





 Blood    No Growth after 72 hours














Assessment and Plan


Assessment: 


Acute COPD exacerbation





Purulent tracheobronchitis





Bilateral lower lobe pneumonia





Sepsis with a spiking fever tachypnea and tachycardia and leukocytosis





Hypomagnesemia





Severe COPD emphysema





Chronic headaches





Plan: 


Broad-spectrum antibiotics





Breathing treatments





Increase activity as tolerated further evaluation outpatient setting





Antibiotics and steroids can be switched to oral





Agree with discharge planning follow-up in outpatient setting





Neurological workup for chronic headaches





Time with Patient: Greater than 30

## 2018-12-13 NOTE — P.DS
Providers


Date of admission: 


12/10/18 01:21





Expected date of discharge: 12/13/18


Attending physician: 


Dmitriy Vasquez





Consults: 





 





12/10/18 01:21


Consult Physician Routine 


   Consulting Provider: Rogelio Curiel


   Consult Reason/Comments: COPD exacerbation


   Do you want consulting provider notified?: Yes, Notify in am





12/10/18 12:24


Consult Physician Routine 


   Consulting Provider: Everardo Dutton


   Consult Reason/Comments: migraines


   Do you want consulting provider notified?: Already Contacted











Primary care physician: 


Noemi Kim





[facility] Course: 


Discharge diagnosis


1.  Acute COPD exacerbation: Continue nebulizer treatments and prednisone.  

Patient did receive 1 dose of IV Solu-Medrol in the ER.  Discussed case with 

Dr. Curiel per pulmonary service.  Patient has been cleared for discharge from 

pulmonary standpoint.  Patient will be DC'd home on prednisone taper along with 

azithromycin for 5 more days patient to follow-up closely with PCP in 

consulting providers including pulmonologist





2.  Possible developing community-acquired pneumonia: Chest x-ray showing 

bilateral lower lobe atelectasis.  Patient has been cleared for discharge from 

pulmonary standpoint.  Patient will be DC'd home on prednisone taper and 

azithromycin.





3.  Sepsis present on admission with temporal 102.5, tachycardic and elevated 

white count 12.3.  Influenza screen negative.  Likely related to a possible 

developing pneumonia.  Continue antibiotics.  Continue IV fluids.  Culture 

currently showing no growth.  Patient has been afebrile.  Patient will be DC'd 

home on azithromycin antibiotic.  Patient has been cleared for discharge from 

pulmonary standpoint





4.  Hypomagnesemia: Improved with supplement.  Repeat magnesium 2.1





5.  Nicotine dependence: Discussed smoking cessation greater than 3 minutes.  

add nicotine patch.  13 patch will be ordered upon discharge





6.  Migraine headaches: Recently started on Depakote about 3 days ago by Dr. Chapa.  Computed tomography scan of the brain completed in November 2018 

showing no acute changes.  Did reveal chronic small vessel ischemic changes and 

age-related atrophy.  Patient still complaining of headaches.  Did have some 

improvement with the Norco.  Patient evaluated by neurology.  They have ordered 

3 doses of IV Solu-Medrol and IV magnesium.  MRI of the brain showing mild 

atrophy and moderate periventricular white matter changes.  Per neurology MRI 

of brain did show small vessel ischemic disease or demyelinating disease which 

is in the differential patient started aspirin 81 mg patient also started on 

Toradol and fioricet.  Migraine headaches have resolved.  Per neurology 

services if headaches persist a sphenopalatine block and be performed in the 

outpatient setting.  Patient to follow-up outpatient for neurology services.





7.  Essential hypertension.  Blood pressure stable





8.  Hypothyroidism continue Synthroid





9.  History of vertebral fractures and chronic back pain





10. Diabetes type 2: resume metformin and add sliding scale.   Hyperglycemia 

secondary to steroids.  Patient receiving a dose of NovoLog 5 units.  Patient 

is blood sugar remained high.  Patient was started on insulin drip overnight.  

Blood sugars have significantly improved.  Patient currently is off IV 

steroids.  Patient will be DC'd home on home metformin dose along with 

glipizide 5 mg before meals.  Patient educated to hold dose if meal is skipped.

  Patient will also be given a paper prescription for glucometer patient to 

follow-up closely with primary care provider for further management of his 

diabetes. 





11.  Mild sinusitis.  Continue antibiotics.  Patient to be DC'd on 5 more days 

of azithromycin





12.  Hyperkalemia potassium 5.4.  Kayexalate will be ordered.  Resolved repeat 

potassium 4.2











Hospital course


This is a 73-year-old male, patient of [facility].  Patient has a 

known past medical history of COPD, diabetes mellitus type 2, GERD, hypertension

, vertebral fractures, nicotine dependence and hypothyroidism.  Patient 

presents to the emergency room with 3 day complaint of nonproductive cough and 

worsening shortness of breath with no improvement.  Chest x-ray showed 

bilateral lower lobe atelectasis.  He did have elevated white count 12.3 temp 

as high as 102.5 and was tachycardic on admission.  Influenza screen was 

negative.  He was started on Rocephin and azithromycin for a possible 

developing pneumonia.  Was given 1 dose of IV Solu-Medrol in the ER.  Pulmonary 

service has been placed on consult.  Patient also complaining of a headache.  

Patient reports that his been dealing with frequent headaches since November.  

He is followed up with neurology in the outpatient setting he had a computed 

tomography scan of the brain in November with no acute changes.  And he is 

recently been started on Depakote 3 days ago.  Patient is still complaining of 

a headache.  Did have some improvement with the Norco.  Patient denies any 

chest pain, nausea or vomiting, bowel movement changes or urinary symptoms.  He 

did have elevated blood sugar in the 500s likely related to steroids this polyp 

as well as eating the neighboring patients chela.  Patient is been educated 

to follow diabetic diet.





12/11/18 patient reports improvement in his shortness of breath.  Still having 

slight cough.  He's been afebrile.  IV site medical started by neurology in 

regards to patient's headache.  MRI of the brain revealing mild atrophy, 

moderate periventricular white matter changes.  Consider demyelination disease 

and chronic small vessel ischemic changes.  And mild sinusitis.  Patient doesn'

t port some improvement in his headache with the IV Solu-Medrol.  However, 

patient reports the headache is still persisting.  Blood sugars are elevated in 

the 300s.  NovoLog extra 5 units was given.  If remains elevated patient will 

need to be placed on a insulin drip.  Patient denies any chest pain.  Reports 

having bowel movements.  Denies any burning with urination.








On 12/12/2018 patient is currently resting in bed.  Patient does report some 

improvement shortness of breath still has cough.  Patient remains afebrile.  

Patient remains on IV Solu-Medrol for migraine headache.  This time patient 

denies chest pain or shortness of breath.  Patient denies nausea vomiting or 

diarrhea.  Patient denies any urinary burning or frequency








On 12/13/2018 patient is currently sitting up in chair.  Patient states he is 

very eager to go home.  Patient states that he feels much improved.  Headaches 

have completely subsided.  Patient is currently on insulin drip due to elevated 

blood sugars.  IV Solu-Medrol has been discontinued.  Patient will be resumed 

on home metformin dose along with glipizide 500s before meals patient educated 

to hold dose if meal is skipped.  Patient will also begin ordered for 

glucometer to monitor blood sugars at home.  Patient to follow-up closely with 

early next week to follow-up with ongoing issues.  At this time patient denies 

chest pain or shortness of breath.  Patient denies nausea vomiting or diarrhea.

  Patient denies any urinary burning or frequency.  Pulmonary services have 

cleared patient for discharge.  Patient to follow-up outpatient with 

pulmonology and neurology services.  Patient will be DC'd home on prednisone 

taper along with azithromycin antibiotic.





I performed an examination of the patient and discussed their management with 

the Nurse Practitioner.  I have reviewed the Nurse Practitioner's notes and 

agree with the documented findings and plan of care


Patient Condition at Discharge: Stable





Plan - Discharge Summary


New Discharge Prescriptions: 


New


   Azithromycin [Zithromax] 500 mg PO Q24H 5 Days #5 tab


   Nicotine 14Mg/24Hr Patch [Habitrol] 1 patch TRANSDERM DAILY #30 patch


   predniSONE 10 mg PO AS DIRECTED #30 tab


   glipiZIDE [Glucotrol] 5 mg PO AC-TID #90 tab





Continue


   metFORMIN HCL 1,000 mg PO BID


   Levothyroxine Sodium [Synthroid] 25 mcg PO DAILY #30 tab


   HYDROcodone/APAP 10-325MG [Norco ] 1 tab PO TID PRN


     PRN Reason: Pain


   hydrOXYzine HCL [Atarax] 25 mg PO QID PRN


     PRN Reason: Itching


   Naproxen 500 mg PO BID


   Losartan/Hydrochlorothiazide [Losartan-Hctz 100-25 mg Tab] 1 tab PO DAILY


   Divalproex [Depakote] 250 mg PO BID


Discharge Medication List





metFORMIN HCL 1,000 mg PO BID 11/23/16 [History]


Levothyroxine Sodium [Synthroid] 25 mcg PO DAILY #30 tab 03/10/18 [Rx]


HYDROcodone/APAP 10-325MG [Norco ] 1 tab PO TID PRN 05/15/18 [History]


Losartan/Hydrochlorothiazide [Losartan-Hctz 100-25 mg Tab] 1 tab PO DAILY 11/19/ 18 [History]


Naproxen 500 mg PO BID 11/19/18 [History]


hydrOXYzine HCL [Atarax] 25 mg PO QID PRN 11/19/18 [History]


Divalproex [Depakote] 250 mg PO BID 12/10/18 [History]


Azithromycin [Zithromax] 500 mg PO Q24H 5 Days #5 tab 12/13/18 [Rx]


Nicotine 14Mg/24Hr Patch [Habitrol] 1 patch TRANSDERM DAILY #30 patch 12/13/18 [

Rx]


glipiZIDE [Glucotrol] 5 mg PO AC-TID #90 tab 12/13/18 [Rx]


predniSONE 10 mg PO AS DIRECTED #30 tab 12/13/18 [Rx]








Follow up Appointment(s)/Referral(s): 


Everardo Dutton MD [STAFF PHYSICIAN] - 2 Weeks


Noemi Kim DO [Primary Care Provider] - 12/17/18 2:30 pm


Activity/Diet/Wound Care/Special Instructions: 


Activity as tolerated








Diet heart healthy





Patient to follow-up with Julio ware on Monday, 12/17/2018 for close 

monitoring of blood sugar


Patient to resume home metformin along with new prescription for glipizide 5 mg 

before meals.  Patient to not take dose if meal is skipped


Glucometer prescription given to patient





Discharge Disposition: HOME SELF-CARE

## 2018-12-13 NOTE — P.PN
Subjective


Progress Note Date: 12/12/18


Principal diagnosis: 





Bilateral pneumonia, acute COPD exacerbation, end-stage lung disease in his 

severe COPD emphysema, hypertension hypertensive cardiovascular disease, 

chronic migrainous headaches





12/12/2018, patient seen eval examined during the rounds clinically patient has 

been doing slightly better in terms of breathing but is still of ongoing cough 

congestion shortness of breath, neurology is evaluating patient for migraine 

headache, patient has been gently rehydrated also getting IV steroids for COPD 

exacerbation





12/11/2018, patient seen eval examined during the rounds cuff congestion 

shortness breath is improved respiratory status slightly better with still get 

short of breath and activity and exertion patient is still continue to complain 

of headaches intermittently neurology is evaluating this patient on is for EEG 

later on today





73-year-old male who has end-stage lung disease is severe COPD emphysema with 

complex past medical history associated with type 2 diabetes mellitus GERD 

hypertension a potential cardiovascular disease and disease


The second to severe COPD emphysema patient has been long-term smoker he has 

not been feeling well for the last 3 or 4 days increased cuff congestion 

shortness of breath he was started on Rocephin for Zithromax with some relief 

and improvement but still get short of breath, patient has intermittent 

headaches as well neurology has been following, on specific questioning denies 

any seizure-like to a loss of consciousness hemiparesis, denies any chest pain 

or radiation of pain dye and dry as any bowel or bladder dysfunction





Objective





- Vital Signs


Vital signs: 


 Vital Signs











Temp  96.9 F L  12/12/18 20:17


 


Pulse  97   12/12/18 20:17


 


Resp  16   12/12/18 20:17


 


BP  156/76   12/12/18 20:17


 


Pulse Ox  93 L  12/12/18 20:17








 Intake & Output











 12/12/18 12/12/18 12/13/18





 06:59 18:59 06:59


 


Intake Total 860 1220 35.939


 


Balance 860 1220 35.939


 


Intake:   


 


  Intake, IV Titration 860 600 35.939





  Amount   


 


    Insulin Regular 100 unit   35.939





    In Sodium Chloride 0.9%   





    100 ml @ Titrate IV .Q0M   





    [facility] Rx#:[id number]   


 


    Sodium Chloride 0.9% 1, 760 600 





    000 ml @ 75 mls/hr IV .   





    [id number] [facility] Rx#:[id number]   


 


    cefTRIAXone 1,000 mg In 100  





    Sodium Chloride 0.9% 50   





    ml @ 100 mls/hr IVPB Q24H   





    [facility] Rx#:[id number]   


 


  Oral  620 


 


Other:   


 


  Voiding Method Toilet Toilet 


 


  # Voids  5 














- Exam


HEENT head atraumatic normocephalic





Eyes clear without exam neck supple without adenopathy jugular venous 

distention or bruit





Neck supple without adenopathy jugular venous distention carotid bruit, no 

lymphadenopathy





Lungs bilateral respiratory extreme wheezing and rhonchi present





Heart regular rate and rhythm S1-S2 audible 





abdominal soft no rebound rigidity 





extremities +1 pedal pulses 





neurological examination awake alert no focal neurological deficit








- Labs


CBC & Chem 7: 


 12/12/18 08:51





 12/12/18 13:57


Labs: 


 Abnormal Lab Results - Last 24 Hours (Table)











  12/12/18 12/12/18 12/12/18 Range/Units





  07:21 08:51 08:51 


 


WBC   19.8 H   (3.8-10.6)  k/uL


 


Hgb   12.8 L   (13.0-17.5)  gm/dL


 


Neutrophils #   17.1 H   (1.3-7.7)  k/uL


 


Potassium    5.4 H  (3.5-5.1)  mmol/L


 


Carbon Dioxide    21 L  (22-30)  mmol/L


 


BUN    25 H  (9-20)  mg/dL


 


Glucose    314 H  (74-99)  mg/dL


 


POC Glucose (mg/dL)  301 H    (75-99)  mg/dL














  12/12/18 12/12/18 12/12/18 Range/Units





  11:25 17:26 19:28 


 


WBC     (3.8-10.6)  k/uL


 


Hgb     (13.0-17.5)  gm/dL


 


Neutrophils #     (1.3-7.7)  k/uL


 


Potassium     (3.5-5.1)  mmol/L


 


Carbon Dioxide     (22-30)  mmol/L


 


BUN     (9-20)  mg/dL


 


Glucose     (74-99)  mg/dL


 


POC Glucose (mg/dL)  316 H  413 H  456 H  (75-99)  mg/dL














  12/12/18 12/12/18 Range/Units





  20:04 22:07 


 


WBC    (3.8-10.6)  k/uL


 


Hgb    (13.0-17.5)  gm/dL


 


Neutrophils #    (1.3-7.7)  k/uL


 


Potassium    (3.5-5.1)  mmol/L


 


Carbon Dioxide    (22-30)  mmol/L


 


BUN    (9-20)  mg/dL


 


Glucose    (74-99)  mg/dL


 


POC Glucose (mg/dL)  551 H  245 H  (75-99)  mg/dL








 Microbiology - Last 24 Hours (Table)











 12/09/18 23:40 Blood Culture - Preliminary





 Blood    No Growth after 48 hours














Assessment and Plan


Assessment: 


Acute COPD exacerbation





Purulent tracheobronchitis





Bilateral lower lobe pneumonia





Sepsis with a spiking fever tachypnea and tachycardia and leukocytosis





Hypomagnesemia





Severe COPD emphysema





Chronic headaches





Plan: 


Broad-spectrum antibiotics





Breathing treatments





IV steroids





Increase activity as tolerated further evaluation outpatient setting





Antibiotics and steroids can be switched to oral


Once clinically improved in next 24-48 hours





Neurological workup for chronic headaches





Time with Patient: Greater than 30

## 2019-01-15 ENCOUNTER — HOSPITAL ENCOUNTER (EMERGENCY)
Dept: HOSPITAL 47 - EC | Age: 74
Discharge: HOME | End: 2019-01-15
Payer: MEDICARE

## 2019-01-15 VITALS — SYSTOLIC BLOOD PRESSURE: 144 MMHG | HEART RATE: 79 BPM | DIASTOLIC BLOOD PRESSURE: 76 MMHG

## 2019-01-15 VITALS — TEMPERATURE: 97.6 F | RESPIRATION RATE: 18 BRPM

## 2019-01-15 DIAGNOSIS — Z79.890: ICD-10-CM

## 2019-01-15 DIAGNOSIS — E03.9: ICD-10-CM

## 2019-01-15 DIAGNOSIS — I10: ICD-10-CM

## 2019-01-15 DIAGNOSIS — Z79.899: ICD-10-CM

## 2019-01-15 DIAGNOSIS — F17.200: ICD-10-CM

## 2019-01-15 DIAGNOSIS — Z79.84: ICD-10-CM

## 2019-01-15 DIAGNOSIS — J02.9: Primary | ICD-10-CM

## 2019-01-15 DIAGNOSIS — E11.9: ICD-10-CM

## 2019-01-15 DIAGNOSIS — J44.9: ICD-10-CM

## 2019-01-15 PROCEDURE — 99284 EMERGENCY DEPT VISIT MOD MDM: CPT

## 2019-01-15 PROCEDURE — 71046 X-RAY EXAM CHEST 2 VIEWS: CPT

## 2019-01-15 PROCEDURE — 87430 STREP A AG IA: CPT

## 2019-01-15 PROCEDURE — 70360 X-RAY EXAM OF NECK: CPT

## 2019-01-15 PROCEDURE — 87081 CULTURE SCREEN ONLY: CPT

## 2019-01-15 NOTE — XR
EXAMINATION TYPE: XR chest 2V

 

DATE OF EXAM: 1/15/2019

 

COMPARISON: 12/10/2018

 

HISTORY: Neck pain and chest pain

 

TECHNIQUE:  Frontal and lateral views of the chest are obtained.

 

FINDINGS:  Chronic bibasilar platelike subsegmental atelectasis is seen. Pulmonary hyperinflation sug
gests underlying COPD with flattening of the right hemidiaphragm. Slight left hemidiaphragm elevation
 is chronic. Surgical sutures are noted at the medial right lung apex. Cardia mediastinal silhouette 
is mildly enlarged. No new focal consolidation, pleural effusion or pneumothorax is seen. There is mi
ld diffuse osseous demineralization and mild multilevel degenerative change of the thoracic spine.

 

IMPRESSION:  Chronic findings of left hemidiaphragm elevation, bibasilar atelectasis, underlying COPD
 and postsurgical change of the right lung apex. No acute cardiopulmonary process.
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
## 2019-01-15 NOTE — XR
EXAMINATION TYPE: XR soft tissue neck

 

DATE OF EXAM: 1/15/2019

 

COMPARISON: NONE

 

HISTORY: Neck pain

 

TECHNIQUE: Frontal and lateral views of the soft tissues of the neck were obtained

 

FINDINGS: Nasopharynx and oropharynx appear patent. The C2-C5 vertebral bodies appear aligned. The pa
tient's shoulders obscure the lower cervical spine. Mild multilevel degenerative changes are seen. No
 radiopaque foreign body. Incidentally noted left carotid calcifications are present. Surgical suture
s at the right lung apex are partially visualized medially as well as dentures.

 

IMPRESSION: Unremarkable radiograph of the soft tissues the neck.

## 2019-07-21 ENCOUNTER — HOSPITAL ENCOUNTER (EMERGENCY)
Dept: HOSPITAL 47 - EC | Age: 74
Discharge: HOME | End: 2019-07-21
Payer: MEDICARE

## 2019-07-21 VITALS
HEART RATE: 91 BPM | SYSTOLIC BLOOD PRESSURE: 102 MMHG | RESPIRATION RATE: 17 BRPM | DIASTOLIC BLOOD PRESSURE: 57 MMHG | TEMPERATURE: 98.6 F
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
## 2019-07-21 DIAGNOSIS — E11.9: ICD-10-CM

## 2019-07-21 DIAGNOSIS — Z79.890: ICD-10-CM

## 2019-07-21 DIAGNOSIS — E03.9: ICD-10-CM

## 2019-07-21 DIAGNOSIS — Z79.84: ICD-10-CM

## 2019-07-21 DIAGNOSIS — F17.200: ICD-10-CM

## 2019-07-21 DIAGNOSIS — S60.450A: Primary | ICD-10-CM

## 2019-07-21 DIAGNOSIS — Z79.899: ICD-10-CM

## 2019-07-21 DIAGNOSIS — Z23: ICD-10-CM

## 2019-07-21 DIAGNOSIS — I10: ICD-10-CM

## 2019-07-21 PROCEDURE — 90715 TDAP VACCINE 7 YRS/> IM: CPT

## 2019-07-21 PROCEDURE — 10120 INC&RMVL FB SUBQ TISS SMPL: CPT

## 2019-07-21 PROCEDURE — 90471 IMMUNIZATION ADMIN: CPT

## 2019-07-21 PROCEDURE — 96372 THER/PROPH/DIAG INJ SC/IM: CPT

## 2019-07-21 PROCEDURE — 99283 EMERGENCY DEPT VISIT LOW MDM: CPT
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
## 2019-08-06 ENCOUNTER — HOSPITAL ENCOUNTER (OUTPATIENT)
Dept: HOSPITAL 47 - RADMRIMAIN | Age: 74
Discharge: HOME | End: 2019-08-06
Payer: MEDICARE

## 2019-08-06 DIAGNOSIS — M51.36: Primary | ICD-10-CM

## 2019-08-06 DIAGNOSIS — M46.96: ICD-10-CM

## 2019-08-06 PROCEDURE — 72148 MRI LUMBAR SPINE W/O DYE: CPT

## 2019-08-06 NOTE — MR
EXAMINATION TYPE: MR lumbar spine wo con

 

DATE OF EXAM: 8/6/2019

 

COMPARISON: MRI 10/27/2003

 

HISTORY: Low back pain

 

TECHNIQUE: T1 and T2  axial and sagittal images of the lumbar spine are submitted.  

 

FINDINGS: There is no abnormal signal seen within the visualized spinal cord or paraspinal soft tissu
es. There is multilevel degenerative mild disc disease with hypertrophic spurring noted anteriorly at
 multiple levels. Aorta measures 2.7 cm in maximum dimension

 

At T12-L1 there is no disc herniation or canal stenosis. No foraminal encroachment.

 

At L1-2 there is no disc herniation or canal stenosis. No foraminal encroachment. Mild facet arthropa
thy.

 

At L2-3 there is no disc herniation or canal stenosis. No foraminal encroachment. Mild facet arthropa
thy.

 

At L3-4 there is moderate facet arthropathy. No disc herniation. No Canal stenosis.

 

At L4-5 there is moderate facet arthropathy. No Canal stenosis. No disc herniation or foraminal encro
achment

 

At L5-S1 there is advanced facet arthropathy. No significant foraminal encroachment or canal stenosis
. No disc herniation.

 

 

IMPRESSION:

1. Multilevel mild degenerative disc disease with no disc herniation, canal stenosis, or foraminal en
croachment.

2. Multilevel moderate facet arthropathy with advanced facet arthropathy at L5-S1.

## 2019-09-21 ENCOUNTER — HOSPITAL ENCOUNTER (INPATIENT)
Dept: HOSPITAL 47 - EC | Age: 74
LOS: 2 days | Discharge: HOME | DRG: 641 | End: 2019-09-23
Attending: FAMILY MEDICINE | Admitting: FAMILY MEDICINE
Payer: MEDICARE

## 2019-09-21 VITALS — BODY MASS INDEX: 24.3 KG/M2

## 2019-09-21 DIAGNOSIS — R20.0: ICD-10-CM

## 2019-09-21 DIAGNOSIS — Z79.1: ICD-10-CM

## 2019-09-21 DIAGNOSIS — J44.9: ICD-10-CM

## 2019-09-21 DIAGNOSIS — I10: ICD-10-CM

## 2019-09-21 DIAGNOSIS — K21.9: ICD-10-CM

## 2019-09-21 DIAGNOSIS — G44.89: ICD-10-CM

## 2019-09-21 DIAGNOSIS — M54.9: ICD-10-CM

## 2019-09-21 DIAGNOSIS — Z90.49: ICD-10-CM

## 2019-09-21 DIAGNOSIS — G89.29: ICD-10-CM

## 2019-09-21 DIAGNOSIS — H70.10: ICD-10-CM

## 2019-09-21 DIAGNOSIS — I95.1: ICD-10-CM

## 2019-09-21 DIAGNOSIS — E86.0: Primary | ICD-10-CM

## 2019-09-21 DIAGNOSIS — Z86.010: ICD-10-CM

## 2019-09-21 DIAGNOSIS — Z79.899: ICD-10-CM

## 2019-09-21 DIAGNOSIS — F17.200: ICD-10-CM

## 2019-09-21 DIAGNOSIS — Z79.890: ICD-10-CM

## 2019-09-21 DIAGNOSIS — E11.9: ICD-10-CM

## 2019-09-21 DIAGNOSIS — Z79.84: ICD-10-CM

## 2019-09-21 DIAGNOSIS — J98.11: ICD-10-CM

## 2019-09-21 DIAGNOSIS — N17.9: ICD-10-CM

## 2019-09-21 DIAGNOSIS — E86.9: ICD-10-CM

## 2019-09-21 DIAGNOSIS — E87.2: ICD-10-CM

## 2019-09-21 DIAGNOSIS — E03.9: ICD-10-CM

## 2019-09-21 DIAGNOSIS — Z83.3: ICD-10-CM

## 2019-09-21 DIAGNOSIS — M47.9: ICD-10-CM

## 2019-09-21 LAB
ALBUMIN SERPL-MCNC: 4.1 G/DL (ref 3.5–5)
ALP SERPL-CCNC: 124 U/L (ref 38–126)
ALT SERPL-CCNC: 29 U/L (ref 21–72)
AMYLASE SERPL-CCNC: 65 U/L (ref 30–110)
ANION GAP SERPL CALC-SCNC: 12 MMOL/L
AST SERPL-CCNC: 19 U/L (ref 17–59)
BASOPHILS # BLD AUTO: 0 K/UL (ref 0–0.2)
BASOPHILS NFR BLD AUTO: 0 %
BUN SERPL-SCNC: 43 MG/DL (ref 9–20)
CALCIUM SPEC-MCNC: 9.3 MG/DL (ref 8.4–10.2)
CHLORIDE SERPL-SCNC: 116 MMOL/L (ref 98–107)
CO2 SERPL-SCNC: 15 MMOL/L (ref 22–30)
EOSINOPHIL # BLD AUTO: 0.5 K/UL (ref 0–0.7)
EOSINOPHIL NFR BLD AUTO: 6 %
ERYTHROCYTE [DISTWIDTH] IN BLOOD BY AUTOMATED COUNT: 4.34 M/UL (ref 4.3–5.9)
ERYTHROCYTE [DISTWIDTH] IN BLOOD: 15.9 % (ref 11.5–15.5)
GLUCOSE BLD-MCNC: 158 MG/DL (ref 75–99)
GLUCOSE SERPL-MCNC: 100 MG/DL (ref 74–99)
HCT VFR BLD AUTO: 37.3 % (ref 39–53)
HGB BLD-MCNC: 12.6 GM/DL (ref 13–17.5)
INR PPP: 0.9 (ref ?–1.2)
LYMPHOCYTES # SPEC AUTO: 2.2 K/UL (ref 1–4.8)
LYMPHOCYTES NFR SPEC AUTO: 24 %
MCH RBC QN AUTO: 29.1 PG (ref 25–35)
MCHC RBC AUTO-ENTMCNC: 33.8 G/DL (ref 31–37)
MCV RBC AUTO: 86 FL (ref 80–100)
MONOCYTES # BLD AUTO: 0.7 K/UL (ref 0–1)
MONOCYTES NFR BLD AUTO: 7 %
NEUTROPHILS # BLD AUTO: 5.6 K/UL (ref 1.3–7.7)
NEUTROPHILS NFR BLD AUTO: 61 %
PH UR: 5 [PH] (ref 5–8)
PLATELET # BLD AUTO: 354 K/UL (ref 150–450)
POTASSIUM SERPL-SCNC: 4.7 MMOL/L (ref 3.5–5.1)
PROT SERPL-MCNC: 7.3 G/DL (ref 6.3–8.2)
PT BLD: 9.4 SEC (ref 9–12)
SODIUM SERPL-SCNC: 143 MMOL/L (ref 137–145)
SP GR UR: 1.01 (ref 1–1.03)
UROBILINOGEN UR QL STRIP: <2 MG/DL (ref ?–2)
WBC # BLD AUTO: 9.3 K/UL (ref 3.8–10.6)

## 2019-09-21 PROCEDURE — 96374 THER/PROPH/DIAG INJ IV PUSH: CPT

## 2019-09-21 PROCEDURE — 80048 BASIC METABOLIC PNL TOTAL CA: CPT

## 2019-09-21 PROCEDURE — 84443 ASSAY THYROID STIM HORMONE: CPT

## 2019-09-21 PROCEDURE — 85025 COMPLETE CBC W/AUTO DIFF WBC: CPT

## 2019-09-21 PROCEDURE — 84439 ASSAY OF FREE THYROXINE: CPT

## 2019-09-21 PROCEDURE — 71046 X-RAY EXAM CHEST 2 VIEWS: CPT

## 2019-09-21 PROCEDURE — 80053 COMPREHEN METABOLIC PANEL: CPT

## 2019-09-21 PROCEDURE — 82150 ASSAY OF AMYLASE: CPT

## 2019-09-21 PROCEDURE — 93005 ELECTROCARDIOGRAM TRACING: CPT

## 2019-09-21 PROCEDURE — 70450 CT HEAD/BRAIN W/O DYE: CPT

## 2019-09-21 PROCEDURE — 85610 PROTHROMBIN TIME: CPT

## 2019-09-21 PROCEDURE — 83605 ASSAY OF LACTIC ACID: CPT

## 2019-09-21 PROCEDURE — 83690 ASSAY OF LIPASE: CPT

## 2019-09-21 PROCEDURE — 36415 COLL VENOUS BLD VENIPUNCTURE: CPT

## 2019-09-21 PROCEDURE — 81003 URINALYSIS AUTO W/O SCOPE: CPT

## 2019-09-21 PROCEDURE — 96361 HYDRATE IV INFUSION ADD-ON: CPT

## 2019-09-21 PROCEDURE — 84484 ASSAY OF TROPONIN QUANT: CPT

## 2019-09-21 PROCEDURE — 99285 EMERGENCY DEPT VISIT HI MDM: CPT

## 2019-09-21 RX ADMIN — MORPHINE SULFATE PRN MG: 4 INJECTION, SOLUTION INTRAMUSCULAR; INTRAVENOUS at 19:14

## 2019-09-21 RX ADMIN — MORPHINE SULFATE PRN MG: 4 INJECTION, SOLUTION INTRAMUSCULAR; INTRAVENOUS at 23:48

## 2019-09-21 RX ADMIN — CEFAZOLIN SCH MLS/HR: 330 INJECTION, POWDER, FOR SOLUTION INTRAMUSCULAR; INTRAVENOUS at 18:28
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
## 2019-09-21 NOTE — XR
EXAMINATION TYPE: XR chest 2V

 

DATE OF EXAM: 9/21/2019

 

COMPARISON: 1/15/2019

 

HISTORY: Dizziness

 

TECHNIQUE:  Frontal and lateral views of the chest are obtained.

 

FINDINGS:  There is some atelectasis left lung base. There is no heart failure. Heart size is normal.
 There are chest leads. Bony thorax is intact.

 

IMPRESSION:  Left basilar pleural reaction and atelectasis unchanged. No heart failure.

## 2019-09-21 NOTE — CT
EXAMINATION TYPE: CT brain wo con

 

DATE OF EXAM: 9/21/2019

 

COMPARISON: 11/19/2018

 

HISTORY: dizziness, fall

 

CT DLP: 1098.4 mGycm

Automated exposure control for dose reduction was used.

 

FINDINGS: 

There is cerebral cortical atrophy. There is no mass effect nor midline shift. There is no sign of in
tracranial hemorrhage. There is mild white matter hypodensity. Calvarium is intact. There is little p
neumatization of the mastoid sinuses.

 

IMPRESSION: 

CEREBRAL ATROPHY AND CHRONIC MILD SMALL VESSEL ISCHEMIA. CHRONIC MASTOIDITIS. NO CHANGE.

## 2019-09-22 LAB
ANION GAP SERPL CALC-SCNC: 8 MMOL/L
BASOPHILS # BLD AUTO: 0.1 K/UL (ref 0–0.2)
BASOPHILS NFR BLD AUTO: 1 %
BUN SERPL-SCNC: 28 MG/DL (ref 9–20)
CALCIUM SPEC-MCNC: 9.3 MG/DL (ref 8.4–10.2)
CHLORIDE SERPL-SCNC: 118 MMOL/L (ref 98–107)
CO2 SERPL-SCNC: 17 MMOL/L (ref 22–30)
EOSINOPHIL # BLD AUTO: 0.6 K/UL (ref 0–0.7)
EOSINOPHIL NFR BLD AUTO: 7 %
ERYTHROCYTE [DISTWIDTH] IN BLOOD BY AUTOMATED COUNT: 4.32 M/UL (ref 4.3–5.9)
ERYTHROCYTE [DISTWIDTH] IN BLOOD: 15.9 % (ref 11.5–15.5)
GLUCOSE BLD-MCNC: 103 MG/DL (ref 75–99)
GLUCOSE BLD-MCNC: 104 MG/DL (ref 75–99)
GLUCOSE BLD-MCNC: 120 MG/DL (ref 75–99)
GLUCOSE BLD-MCNC: 97 MG/DL (ref 75–99)
GLUCOSE SERPL-MCNC: 90 MG/DL (ref 74–99)
HCT VFR BLD AUTO: 37.9 % (ref 39–53)
HGB BLD-MCNC: 12.4 GM/DL (ref 13–17.5)
LYMPHOCYTES # SPEC AUTO: 2.7 K/UL (ref 1–4.8)
LYMPHOCYTES NFR SPEC AUTO: 32 %
MCH RBC QN AUTO: 28.8 PG (ref 25–35)
MCHC RBC AUTO-ENTMCNC: 32.8 G/DL (ref 31–37)
MCV RBC AUTO: 87.9 FL (ref 80–100)
MONOCYTES # BLD AUTO: 0.6 K/UL (ref 0–1)
MONOCYTES NFR BLD AUTO: 8 %
NEUTROPHILS # BLD AUTO: 4.3 K/UL (ref 1.3–7.7)
NEUTROPHILS NFR BLD AUTO: 50 %
PLATELET # BLD AUTO: 328 K/UL (ref 150–450)
POTASSIUM SERPL-SCNC: 5.3 MMOL/L (ref 3.5–5.1)
SODIUM SERPL-SCNC: 143 MMOL/L (ref 137–145)
T4 FREE SERPL-MCNC: 1.58 NG/DL (ref 0.78–2.19)
WBC # BLD AUTO: 8.6 K/UL (ref 3.8–10.6)

## 2019-09-22 RX ADMIN — IBUPROFEN PRN MG: 400 TABLET, FILM COATED ORAL at 08:35

## 2019-09-22 RX ADMIN — CEFAZOLIN SCH MLS/HR: 330 INJECTION, POWDER, FOR SOLUTION INTRAMUSCULAR; INTRAVENOUS at 14:44

## 2019-09-22 RX ADMIN — HEPARIN SODIUM SCH UNIT: 5000 INJECTION, SOLUTION INTRAVENOUS; SUBCUTANEOUS at 15:27

## 2019-09-22 RX ADMIN — LEVOTHYROXINE SODIUM SCH MCG: 25 TABLET ORAL at 05:50

## 2019-09-22 RX ADMIN — KETOROLAC TROMETHAMINE PRN MG: 30 INJECTION, SOLUTION INTRAMUSCULAR at 05:53

## 2019-09-22 RX ADMIN — KETOROLAC TROMETHAMINE PRN MG: 30 INJECTION, SOLUTION INTRAMUSCULAR at 17:08

## 2019-09-22 RX ADMIN — HYDROCODONE BITARTRATE AND ACETAMINOPHEN PRN EACH: 10; 325 TABLET ORAL at 12:01

## 2019-09-22 RX ADMIN — CEFAZOLIN SCH MLS/HR: 330 INJECTION, POWDER, FOR SOLUTION INTRAMUSCULAR; INTRAVENOUS at 04:45

## 2019-09-22 RX ADMIN — HYDROCODONE BITARTRATE AND ACETAMINOPHEN PRN EACH: 10; 325 TABLET ORAL at 20:34

## 2019-09-22 RX ADMIN — PANTOPRAZOLE SODIUM SCH MG: 40 INJECTION, POWDER, FOR SOLUTION INTRAVENOUS at 07:35

## 2019-09-22 RX ADMIN — IBUPROFEN PRN MG: 400 TABLET, FILM COATED ORAL at 15:29

## 2019-09-22 RX ADMIN — HEPARIN SODIUM SCH UNIT: 5000 INJECTION, SOLUTION INTRAVENOUS; SUBCUTANEOUS at 07:35
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
## 2019-09-23 VITALS
RESPIRATION RATE: 17 BRPM | DIASTOLIC BLOOD PRESSURE: 71 MMHG | SYSTOLIC BLOOD PRESSURE: 155 MMHG | TEMPERATURE: 98.3 F | HEART RATE: 71 BPM

## 2019-09-23 LAB
ANION GAP SERPL CALC-SCNC: 8 MMOL/L
BASOPHILS # BLD AUTO: 0.2 K/UL (ref 0–0.2)
BASOPHILS NFR BLD AUTO: 2 %
BUN SERPL-SCNC: 23 MG/DL (ref 9–20)
CALCIUM SPEC-MCNC: 8.8 MG/DL (ref 8.4–10.2)
CHLORIDE SERPL-SCNC: 113 MMOL/L (ref 98–107)
CO2 SERPL-SCNC: 19 MMOL/L (ref 22–30)
EOSINOPHIL # BLD AUTO: 0.4 K/UL (ref 0–0.7)
EOSINOPHIL NFR BLD AUTO: 6 %
ERYTHROCYTE [DISTWIDTH] IN BLOOD BY AUTOMATED COUNT: 4.02 M/UL (ref 4.3–5.9)
ERYTHROCYTE [DISTWIDTH] IN BLOOD: 15.6 % (ref 11.5–15.5)
GLUCOSE BLD-MCNC: 114 MG/DL (ref 75–99)
GLUCOSE BLD-MCNC: 92 MG/DL (ref 75–99)
GLUCOSE SERPL-MCNC: 88 MG/DL (ref 74–99)
HCT VFR BLD AUTO: 35.1 % (ref 39–53)
HGB BLD-MCNC: 11.4 GM/DL (ref 13–17.5)
LYMPHOCYTES # SPEC AUTO: 2.1 K/UL (ref 1–4.8)
LYMPHOCYTES NFR SPEC AUTO: 28 %
MCH RBC QN AUTO: 28.3 PG (ref 25–35)
MCHC RBC AUTO-ENTMCNC: 32.5 G/DL (ref 31–37)
MCV RBC AUTO: 87.2 FL (ref 80–100)
MONOCYTES # BLD AUTO: 0.5 K/UL (ref 0–1)
MONOCYTES NFR BLD AUTO: 7 %
NEUTROPHILS # BLD AUTO: 4.2 K/UL (ref 1.3–7.7)
NEUTROPHILS NFR BLD AUTO: 56 %
PLATELET # BLD AUTO: 316 K/UL (ref 150–450)
POTASSIUM SERPL-SCNC: 5.4 MMOL/L (ref 3.5–5.1)
SODIUM SERPL-SCNC: 140 MMOL/L (ref 137–145)
WBC # BLD AUTO: 7.5 K/UL (ref 3.8–10.6)

## 2019-09-23 RX ADMIN — LEVOTHYROXINE SODIUM SCH MCG: 25 TABLET ORAL at 05:21

## 2019-09-23 RX ADMIN — CEFAZOLIN SCH MLS/HR: 330 INJECTION, POWDER, FOR SOLUTION INTRAMUSCULAR; INTRAVENOUS at 00:19

## 2019-09-23 RX ADMIN — HEPARIN SODIUM SCH UNIT: 5000 INJECTION, SOLUTION INTRAVENOUS; SUBCUTANEOUS at 00:19

## 2019-09-23 RX ADMIN — HEPARIN SODIUM SCH UNIT: 5000 INJECTION, SOLUTION INTRAVENOUS; SUBCUTANEOUS at 08:52

## 2019-09-23 RX ADMIN — HYDROCODONE BITARTRATE AND ACETAMINOPHEN PRN EACH: 10; 325 TABLET ORAL at 04:38

## 2019-09-23 RX ADMIN — HEPARIN SODIUM SCH UNIT: 5000 INJECTION, SOLUTION INTRAVENOUS; SUBCUTANEOUS at 15:17

## 2019-09-23 RX ADMIN — PANTOPRAZOLE SODIUM SCH MG: 40 INJECTION, POWDER, FOR SOLUTION INTRAVENOUS at 08:52

## 2019-09-23 RX ADMIN — CEFAZOLIN SCH MLS/HR: 330 INJECTION, POWDER, FOR SOLUTION INTRAMUSCULAR; INTRAVENOUS at 08:52

## 2019-09-23 RX ADMIN — KETOROLAC TROMETHAMINE PRN MG: 30 INJECTION, SOLUTION INTRAMUSCULAR at 04:14

## 2019-09-23 RX ADMIN — HYDROCODONE BITARTRATE AND ACETAMINOPHEN PRN EACH: 10; 325 TABLET ORAL at 10:35
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
## 2020-12-16 ENCOUNTER — HOSPITAL ENCOUNTER (EMERGENCY)
Dept: HOSPITAL 47 - EC | Age: 75
Discharge: HOME | End: 2020-12-16
Payer: MEDICARE

## 2020-12-16 VITALS — HEART RATE: 72 BPM | TEMPERATURE: 98 F | SYSTOLIC BLOOD PRESSURE: 148 MMHG | DIASTOLIC BLOOD PRESSURE: 84 MMHG

## 2020-12-16 VITALS — RESPIRATION RATE: 18 BRPM

## 2020-12-16 DIAGNOSIS — J02.9: ICD-10-CM

## 2020-12-16 DIAGNOSIS — Z79.899: ICD-10-CM

## 2020-12-16 DIAGNOSIS — J40: Primary | ICD-10-CM

## 2020-12-16 DIAGNOSIS — E11.9: ICD-10-CM

## 2020-12-16 DIAGNOSIS — I10: ICD-10-CM

## 2020-12-16 DIAGNOSIS — Z79.890: ICD-10-CM

## 2020-12-16 DIAGNOSIS — F17.200: ICD-10-CM

## 2020-12-16 DIAGNOSIS — Z90.49: ICD-10-CM

## 2020-12-16 DIAGNOSIS — E03.9: ICD-10-CM

## 2020-12-16 DIAGNOSIS — Z20.828: ICD-10-CM

## 2020-12-16 DIAGNOSIS — Z79.84: ICD-10-CM

## 2020-12-16 PROCEDURE — 99284 EMERGENCY DEPT VISIT MOD MDM: CPT

## 2020-12-16 PROCEDURE — 96372 THER/PROPH/DIAG INJ SC/IM: CPT

## 2020-12-16 PROCEDURE — 87635 SARS-COV-2 COVID-19 AMP PRB: CPT
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
## 2020-12-19 ENCOUNTER — HOSPITAL ENCOUNTER (INPATIENT)
Dept: HOSPITAL 47 - EC | Age: 75
LOS: 2 days | Discharge: HOME | DRG: 192 | End: 2020-12-21
Attending: FAMILY MEDICINE | Admitting: FAMILY MEDICINE
Payer: MEDICARE

## 2020-12-19 DIAGNOSIS — I10: ICD-10-CM

## 2020-12-19 DIAGNOSIS — Z79.1: ICD-10-CM

## 2020-12-19 DIAGNOSIS — Z83.3: ICD-10-CM

## 2020-12-19 DIAGNOSIS — F17.200: ICD-10-CM

## 2020-12-19 DIAGNOSIS — K21.9: ICD-10-CM

## 2020-12-19 DIAGNOSIS — G89.29: ICD-10-CM

## 2020-12-19 DIAGNOSIS — Z79.899: ICD-10-CM

## 2020-12-19 DIAGNOSIS — E11.9: ICD-10-CM

## 2020-12-19 DIAGNOSIS — Z90.49: ICD-10-CM

## 2020-12-19 DIAGNOSIS — J44.1: Primary | ICD-10-CM

## 2020-12-19 DIAGNOSIS — J44.0: ICD-10-CM

## 2020-12-19 DIAGNOSIS — Z79.890: ICD-10-CM

## 2020-12-19 DIAGNOSIS — M54.5: ICD-10-CM

## 2020-12-19 DIAGNOSIS — E03.9: ICD-10-CM

## 2020-12-19 DIAGNOSIS — Z20.828: ICD-10-CM

## 2020-12-19 DIAGNOSIS — Z79.84: ICD-10-CM

## 2020-12-19 DIAGNOSIS — Z98.890: ICD-10-CM

## 2020-12-19 LAB
ALBUMIN SERPL-MCNC: 4.3 G/DL (ref 3.5–5)
ALP SERPL-CCNC: 115 U/L (ref 38–126)
ALT SERPL-CCNC: 25 U/L (ref 4–49)
ANION GAP SERPL CALC-SCNC: 7 MMOL/L
APTT BLD: 21.2 SEC (ref 22–30)
AST SERPL-CCNC: 22 U/L (ref 17–59)
BASOPHILS # BLD AUTO: 0.1 K/UL (ref 0–0.2)
BASOPHILS NFR BLD AUTO: 1 %
BUN SERPL-SCNC: 9 MG/DL (ref 9–20)
CALCIUM SPEC-MCNC: 9.3 MG/DL (ref 8.4–10.2)
CHLORIDE SERPL-SCNC: 102 MMOL/L (ref 98–107)
CO2 SERPL-SCNC: 29 MMOL/L (ref 22–30)
EOSINOPHIL # BLD AUTO: 0.3 K/UL (ref 0–0.7)
EOSINOPHIL NFR BLD AUTO: 2 %
ERYTHROCYTE [DISTWIDTH] IN BLOOD BY AUTOMATED COUNT: 4.71 M/UL (ref 4.3–5.9)
ERYTHROCYTE [DISTWIDTH] IN BLOOD: 14.1 % (ref 11.5–15.5)
FERRITIN SERPL-MCNC: 17.5 NG/ML (ref 22–322)
GLUCOSE BLD-MCNC: 294 MG/DL (ref 75–99)
GLUCOSE BLD-MCNC: 302 MG/DL (ref 75–99)
GLUCOSE BLD-MCNC: 483 MG/DL (ref 75–99)
GLUCOSE SERPL-MCNC: 280 MG/DL (ref 74–99)
HCT VFR BLD AUTO: 42.6 % (ref 39–53)
HGB BLD-MCNC: 14.1 GM/DL (ref 13–17.5)
INR PPP: 0.9 (ref ?–1.2)
LDH SPEC-CCNC: 418 U/L (ref 313–618)
LYMPHOCYTES # SPEC AUTO: 3.8 K/UL (ref 1–4.8)
LYMPHOCYTES NFR SPEC AUTO: 23 %
MAGNESIUM SPEC-SCNC: 1.7 MG/DL (ref 1.6–2.3)
MCH RBC QN AUTO: 30 PG (ref 25–35)
MCHC RBC AUTO-ENTMCNC: 33.2 G/DL (ref 31–37)
MCV RBC AUTO: 90.3 FL (ref 80–100)
MONOCYTES # BLD AUTO: 1.3 K/UL (ref 0–1)
MONOCYTES NFR BLD AUTO: 7 %
NEUTROPHILS # BLD AUTO: 11.2 K/UL (ref 1.3–7.7)
NEUTROPHILS NFR BLD AUTO: 66 %
PLATELET # BLD AUTO: 367 K/UL (ref 150–450)
POTASSIUM SERPL-SCNC: 3.8 MMOL/L (ref 3.5–5.1)
PROT SERPL-MCNC: 7.4 G/DL (ref 6.3–8.2)
PT BLD: 9.5 SEC (ref 9–12)
SODIUM SERPL-SCNC: 138 MMOL/L (ref 137–145)
WBC # BLD AUTO: 17 K/UL (ref 3.8–10.6)

## 2020-12-19 PROCEDURE — 87635 SARS-COV-2 COVID-19 AMP PRB: CPT

## 2020-12-19 PROCEDURE — 83615 LACTATE (LD) (LDH) ENZYME: CPT

## 2020-12-19 PROCEDURE — 96365 THER/PROPH/DIAG IV INF INIT: CPT

## 2020-12-19 PROCEDURE — 85730 THROMBOPLASTIN TIME PARTIAL: CPT

## 2020-12-19 PROCEDURE — 85025 COMPLETE CBC W/AUTO DIFF WBC: CPT

## 2020-12-19 PROCEDURE — 84145 PROCALCITONIN (PCT): CPT

## 2020-12-19 PROCEDURE — 93005 ELECTROCARDIOGRAM TRACING: CPT

## 2020-12-19 PROCEDURE — 36415 COLL VENOUS BLD VENIPUNCTURE: CPT

## 2020-12-19 PROCEDURE — 96375 TX/PRO/DX INJ NEW DRUG ADDON: CPT

## 2020-12-19 PROCEDURE — 83605 ASSAY OF LACTIC ACID: CPT

## 2020-12-19 PROCEDURE — 83735 ASSAY OF MAGNESIUM: CPT

## 2020-12-19 PROCEDURE — 85610 PROTHROMBIN TIME: CPT

## 2020-12-19 PROCEDURE — 94640 AIRWAY INHALATION TREATMENT: CPT

## 2020-12-19 PROCEDURE — 87040 BLOOD CULTURE FOR BACTERIA: CPT

## 2020-12-19 PROCEDURE — 71045 X-RAY EXAM CHEST 1 VIEW: CPT

## 2020-12-19 PROCEDURE — 86140 C-REACTIVE PROTEIN: CPT

## 2020-12-19 PROCEDURE — 96367 TX/PROPH/DG ADDL SEQ IV INF: CPT

## 2020-12-19 PROCEDURE — 99285 EMERGENCY DEPT VISIT HI MDM: CPT

## 2020-12-19 PROCEDURE — 82728 ASSAY OF FERRITIN: CPT

## 2020-12-19 PROCEDURE — 80053 COMPREHEN METABOLIC PANEL: CPT

## 2020-12-19 PROCEDURE — 83880 ASSAY OF NATRIURETIC PEPTIDE: CPT

## 2020-12-19 RX ADMIN — ISODIUM CHLORIDE SCH MG: 0.03 SOLUTION RESPIRATORY (INHALATION) at 20:51

## 2020-12-19 RX ADMIN — NICOTINE SCH PATCH: 14 PATCH, EXTENDED RELEASE TRANSDERMAL at 13:04

## 2020-12-19 RX ADMIN — METHYLPREDNISOLONE SODIUM SUCCINATE SCH MG: 40 INJECTION, POWDER, FOR SOLUTION INTRAMUSCULAR; INTRAVENOUS at 16:13

## 2020-12-19 RX ADMIN — KETOROLAC TROMETHAMINE PRN MG: 15 INJECTION, SOLUTION INTRAMUSCULAR; INTRAVENOUS at 17:22

## 2020-12-19 RX ADMIN — OXYCODONE AND ACETAMINOPHEN PRN EACH: 5; 325 TABLET ORAL at 19:35

## 2020-12-19 RX ADMIN — INSULIN ASPART SCH UNIT: 100 INJECTION, SOLUTION INTRAVENOUS; SUBCUTANEOUS at 18:00

## 2020-12-19 RX ADMIN — INSULIN ASPART SCH UNIT: 100 INJECTION, SOLUTION INTRAVENOUS; SUBCUTANEOUS at 21:26

## 2020-12-19 RX ADMIN — INSULIN ASPART SCH UNIT: 100 INJECTION, SOLUTION INTRAVENOUS; SUBCUTANEOUS at 13:03

## 2020-12-19 RX ADMIN — ISODIUM CHLORIDE SCH MG: 0.03 SOLUTION RESPIRATORY (INHALATION) at 16:35

## 2020-12-19 RX ADMIN — BUDESONIDE AND FORMOTEROL FUMARATE DIHYDRATE SCH PUFF: 160; 4.5 AEROSOL RESPIRATORY (INHALATION) at 20:51

## 2020-12-19 RX ADMIN — CEFAZOLIN SCH MLS/HR: 330 INJECTION, POWDER, FOR SOLUTION INTRAMUSCULAR; INTRAVENOUS at 07:35

## 2020-12-19 RX ADMIN — CEFAZOLIN SCH MLS/HR: 330 INJECTION, POWDER, FOR SOLUTION INTRAMUSCULAR; INTRAVENOUS at 16:14

## 2020-12-19 RX ADMIN — FAMOTIDINE SCH MG: 20 TABLET, FILM COATED ORAL at 21:25
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
## 2020-12-19 NOTE — XR
EXAMINATION TYPE: XR chest 1V portable

 

DATE OF EXAM: 12/19/2020

 

COMPARISON: 9/21/2019

 

HISTORY: Cough

 

TECHNIQUE: Single frontal view of the chest is obtained.

 

FINDINGS:  There are bilateral infiltrates. Coarsened interstitium and underlying COPD. No pneumothor
ax. Heart size normal.

 

IMPRESSION:  Bilateral lower lobe infiltrate.
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
## 2020-12-20 VITALS — RESPIRATION RATE: 18 BRPM

## 2020-12-20 LAB
GLUCOSE BLD-MCNC: 284 MG/DL (ref 75–99)
GLUCOSE BLD-MCNC: 345 MG/DL (ref 75–99)
GLUCOSE BLD-MCNC: 372 MG/DL (ref 75–99)
GLUCOSE BLD-MCNC: 390 MG/DL (ref 75–99)

## 2020-12-20 RX ADMIN — CEFAZOLIN SCH MLS/HR: 330 INJECTION, POWDER, FOR SOLUTION INTRAMUSCULAR; INTRAVENOUS at 21:10

## 2020-12-20 RX ADMIN — NICOTINE SCH PATCH: 14 PATCH, EXTENDED RELEASE TRANSDERMAL at 08:03

## 2020-12-20 RX ADMIN — INSULIN ASPART SCH UNIT: 100 INJECTION, SOLUTION INTRAVENOUS; SUBCUTANEOUS at 08:02

## 2020-12-20 RX ADMIN — KETOROLAC TROMETHAMINE PRN MG: 15 INJECTION, SOLUTION INTRAMUSCULAR; INTRAVENOUS at 04:01

## 2020-12-20 RX ADMIN — FAMOTIDINE SCH MG: 20 TABLET, FILM COATED ORAL at 21:11

## 2020-12-20 RX ADMIN — INSULIN ASPART SCH UNIT: 100 INJECTION, SOLUTION INTRAVENOUS; SUBCUTANEOUS at 17:52

## 2020-12-20 RX ADMIN — METHYLPREDNISOLONE SODIUM SUCCINATE SCH MG: 40 INJECTION, POWDER, FOR SOLUTION INTRAMUSCULAR; INTRAVENOUS at 01:42

## 2020-12-20 RX ADMIN — ISODIUM CHLORIDE SCH MG: 0.03 SOLUTION RESPIRATORY (INHALATION) at 12:00

## 2020-12-20 RX ADMIN — OXYCODONE AND ACETAMINOPHEN PRN EACH: 5; 325 TABLET ORAL at 00:19

## 2020-12-20 RX ADMIN — ENOXAPARIN SODIUM SCH MG: 40 INJECTION SUBCUTANEOUS at 08:02

## 2020-12-20 RX ADMIN — CEFAZOLIN SCH MLS/HR: 330 INJECTION, POWDER, FOR SOLUTION INTRAMUSCULAR; INTRAVENOUS at 12:09

## 2020-12-20 RX ADMIN — BUDESONIDE AND FORMOTEROL FUMARATE DIHYDRATE SCH PUFF: 160; 4.5 AEROSOL RESPIRATORY (INHALATION) at 12:08

## 2020-12-20 RX ADMIN — CEFAZOLIN SCH: 330 INJECTION, POWDER, FOR SOLUTION INTRAMUSCULAR; INTRAVENOUS at 02:00

## 2020-12-20 RX ADMIN — FAMOTIDINE SCH MG: 20 TABLET, FILM COATED ORAL at 08:03

## 2020-12-20 RX ADMIN — INSULIN ASPART SCH UNIT: 100 INJECTION, SOLUTION INTRAVENOUS; SUBCUTANEOUS at 21:10

## 2020-12-20 RX ADMIN — METHYLPREDNISOLONE SODIUM SUCCINATE SCH MG: 40 INJECTION, POWDER, FOR SOLUTION INTRAMUSCULAR; INTRAVENOUS at 08:03

## 2020-12-20 RX ADMIN — ISODIUM CHLORIDE SCH MG: 0.03 SOLUTION RESPIRATORY (INHALATION) at 20:45

## 2020-12-20 RX ADMIN — DOXYCYCLINE SCH MG: 100 CAPSULE ORAL at 21:10

## 2020-12-20 RX ADMIN — INSULIN ASPART SCH UNIT: 100 INJECTION, SOLUTION INTRAVENOUS; SUBCUTANEOUS at 12:08

## 2020-12-20 RX ADMIN — PANTOPRAZOLE SODIUM SCH MG: 40 TABLET, DELAYED RELEASE ORAL at 08:03

## 2020-12-20 RX ADMIN — BUDESONIDE AND FORMOTEROL FUMARATE DIHYDRATE SCH: 160; 4.5 AEROSOL RESPIRATORY (INHALATION) at 10:55

## 2020-12-20 RX ADMIN — ISODIUM CHLORIDE SCH MG: 0.03 SOLUTION RESPIRATORY (INHALATION) at 16:34

## 2020-12-20 RX ADMIN — BUDESONIDE AND FORMOTEROL FUMARATE DIHYDRATE SCH PUFF: 160; 4.5 AEROSOL RESPIRATORY (INHALATION) at 20:45

## 2020-12-20 RX ADMIN — ISODIUM CHLORIDE SCH: 0.03 SOLUTION RESPIRATORY (INHALATION) at 10:55

## 2020-12-20 NOTE — XR
EXAMINATION TYPE: XR chest 1V

 

DATE OF EXAM: 12/20/2020

 

HISTORY: Shortness of breath.

 

COMPARISON: 12/19/2020

 

TECHNIQUE: Single view of the chest is submitted.

 

FINDINGS:

Demonstrated are scattered senescent parenchymal change.  

 

Patchy basilar infiltrates persist without significant interval change.

 

The heart is stable.

 

Hilar and mediastinal structures are within normal limits.  

 

Degenerative changes are seen of the dorsal spine. 

 

 IMPRESSION: 

 

1.  Patchy basilar infiltrates persist without significant interval change.

## 2020-12-20 NOTE — P.PN
Subjective





75-year-old pleasant male came in with complaints of cough with sputum 

production patient does symptoms started on February 16 as a sore throat.  Rodolfo french denied any fever or night sweats chest x-ray showing bilateral infiltrates

with possibly of pneumonia patient was recently treated for pharyngitis and 

bronchitis with on Augmentin and Medrol Dosepak comes back with worsening cough 

with sputum production.  Patient white blood cell count is elevated but can be a

steroid effect patient was started on Rocephin and azithromycin and lactic acid 

was 2.0Toprol calcitonin is within normal limits although patient is hypoxic and

is in COPD exacerbation.  Patient was started on 6 systemic steroids patient was

negative for covid 19the patient doesn't use any oxygen at home was requiring 

oxygen on admission presently on room air saturating low 90s.





12/20/2020


Patient is on room air not requiring any oxygen patient blood sugars are highly 

elevated and patient is noncompliant with diet.  We'll cut down the steroids to 

40 mg daily.  We'll change the scale of steroids.





Constitutional: Denied any fatigue denied any fever.


Cardio vascular: denied any chest pain, palpitations


Gastrointestinal denied any nausea vomiting


Pulmonary: shortness of breath significantly improved


Neurologic denied any new focal deficits





All inpatient medications were reviewed and appropriate changes in these 

medications as dictated in the interval history and assessment and plan.





Objective





- Vital Signs


Vital signs: 


                                   Vital Signs











Temp  96.9 F L  12/20/20 11:00


 


Pulse  87   12/20/20 12:08


 


Resp  17   12/20/20 11:00


 


BP  180/83   12/20/20 11:00


 


Pulse Ox  92 L  12/20/20 11:00








                                 Intake & Output











 12/19/20 12/20/20 12/20/20





 18:59 06:59 18:59


 


Intake Total  350 


 


Output Total 475 600 


 


Balance -475 -250 


 


Weight 81.647 kg  


 


Intake:   


 


  Oral  350 


 


Output:   


 


  Urine 475 600 


 


Other:   


 


  Voiding Method Urinal Urinal 


 


  # Voids 300 2 














- Exam





PHYSICAL EXAMINATION: 





GENERAL: The patient is alert and oriented x3, not in any acute distress. Well 

developed, well nourished. 


HEENT: Pupils are round and equally reacting to light. EOMI. No scleral icterus.

No conjunctival pallor. Normocephalic, atraumatic. No pharyngeal erythema. No 

thyromegaly. 


CARDIOVASCULAR: S1 and S2 present. No murmurs, rubs, or gallops. 


PULMONARY: significantly diminished air entry into bilateral lung fields with 

mild expiratory wheezing , respiratory status overall improved compared to 

yesterday


ABDOMEN: Soft, nontender, nondistended, normoactive bowel sounds. No palpable 

organomegaly. 


MUSCULOSKELETAL: No joint swelling or deformity.


EXTREMITIES: No cyanosis, clubbing, or pedal edema. 


NEUROLOGICAL: Gross neurological examination did not reveal any focal deficits. 


SKIN: No rashes. 





- Labs


CBC & Chem 7: 


                                 12/19/20 07:29





                                 12/19/20 07:29


Labs: 


                  Abnormal Lab Results - Last 24 Hours (Table)











  12/19/20 12/19/20 12/19/20 Range/Units





  07:29 17:29 20:34 


 


POC Glucose (mg/dL)   483 H  302 H  (75-99)  mg/dL


 


Ferritin  17.5 L    (22.0-322.0)  ng/mL














  12/20/20 12/20/20 Range/Units





  07:05 11:06 


 


POC Glucose (mg/dL)  372 H  390 H  (75-99)  mg/dL


 


Ferritin    (22.0-322.0)  ng/mL








                      Microbiology - Last 24 Hours (Table)











 12/19/20 07:29 Blood Culture - Preliminary





 Blood    No Growth after 24 hours














Assessment and Plan


Plan: 





-COPD exacerbation: With the tracheobronchitis possibility of pneumonia is low 

for calcitonin is pendingthe patient is pleasant and systemic steroids 

inhalational treatmentssteroids will be switched to oral


-Nicotine dependence: Counseling was provided 


-hypertension


-Type 2 diabetes mellitus: elevated uncontrolled blood sugars secondary to IV 

steroids patient will be switched to oral steroids continue with the sliding 

scale apart from his home regimen for diabetes


-Hypothyroidism


-Gastroesophageal reflux disease


-Chronic low back pain


-due to prophylaxis with Lovenox GI prophylaxis with Pepcid
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
## 2020-12-21 VITALS — TEMPERATURE: 98 F | SYSTOLIC BLOOD PRESSURE: 161 MMHG | DIASTOLIC BLOOD PRESSURE: 81 MMHG

## 2020-12-21 VITALS — HEART RATE: 76 BPM

## 2020-12-21 LAB — GLUCOSE BLD-MCNC: 212 MG/DL (ref 75–99)

## 2020-12-21 RX ADMIN — PANTOPRAZOLE SODIUM SCH MG: 40 TABLET, DELAYED RELEASE ORAL at 08:09

## 2020-12-21 RX ADMIN — INSULIN ASPART SCH UNIT: 100 INJECTION, SOLUTION INTRAVENOUS; SUBCUTANEOUS at 08:09

## 2020-12-21 RX ADMIN — NICOTINE SCH PATCH: 14 PATCH, EXTENDED RELEASE TRANSDERMAL at 08:10

## 2020-12-21 RX ADMIN — ISODIUM CHLORIDE SCH MG: 0.03 SOLUTION RESPIRATORY (INHALATION) at 07:20

## 2020-12-21 RX ADMIN — DOXYCYCLINE SCH MG: 100 CAPSULE ORAL at 08:09

## 2020-12-21 RX ADMIN — CEFAZOLIN SCH: 330 INJECTION, POWDER, FOR SOLUTION INTRAMUSCULAR; INTRAVENOUS at 09:44

## 2020-12-21 RX ADMIN — FAMOTIDINE SCH MG: 20 TABLET, FILM COATED ORAL at 08:09

## 2020-12-21 RX ADMIN — ENOXAPARIN SODIUM SCH MG: 40 INJECTION SUBCUTANEOUS at 08:09

## 2020-12-21 RX ADMIN — BUDESONIDE AND FORMOTEROL FUMARATE DIHYDRATE SCH PUFF: 160; 4.5 AEROSOL RESPIRATORY (INHALATION) at 07:21

## 2021-01-09 ENCOUNTER — HOSPITAL ENCOUNTER (INPATIENT)
Dept: HOSPITAL 47 - EC | Age: 76
LOS: 5 days | Discharge: NURSING, REHAB OR LONG-TERM CARE | DRG: 871 | End: 2021-01-14
Attending: FAMILY MEDICINE | Admitting: FAMILY MEDICINE
Payer: MEDICARE

## 2021-01-09 VITALS — BODY MASS INDEX: 26.7 KG/M2

## 2021-01-09 DIAGNOSIS — A41.9: Primary | ICD-10-CM

## 2021-01-09 DIAGNOSIS — J98.11: ICD-10-CM

## 2021-01-09 DIAGNOSIS — Z79.51: ICD-10-CM

## 2021-01-09 DIAGNOSIS — G45.9: ICD-10-CM

## 2021-01-09 DIAGNOSIS — M19.019: ICD-10-CM

## 2021-01-09 DIAGNOSIS — D51.9: ICD-10-CM

## 2021-01-09 DIAGNOSIS — F05: ICD-10-CM

## 2021-01-09 DIAGNOSIS — G93.41: ICD-10-CM

## 2021-01-09 DIAGNOSIS — Z90.49: ICD-10-CM

## 2021-01-09 DIAGNOSIS — Z20.822: ICD-10-CM

## 2021-01-09 DIAGNOSIS — E03.9: ICD-10-CM

## 2021-01-09 DIAGNOSIS — Z72.820: ICD-10-CM

## 2021-01-09 DIAGNOSIS — E87.2: ICD-10-CM

## 2021-01-09 DIAGNOSIS — Z87.828: ICD-10-CM

## 2021-01-09 DIAGNOSIS — Z79.890: ICD-10-CM
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
## 2021-01-09 DIAGNOSIS — Z83.3: ICD-10-CM

## 2021-01-09 DIAGNOSIS — Z86.010: ICD-10-CM

## 2021-01-09 DIAGNOSIS — J18.9: ICD-10-CM

## 2021-01-09 DIAGNOSIS — Z71.6: ICD-10-CM

## 2021-01-09 DIAGNOSIS — E87.1: ICD-10-CM

## 2021-01-09 DIAGNOSIS — E83.42: ICD-10-CM

## 2021-01-09 DIAGNOSIS — N17.9: ICD-10-CM

## 2021-01-09 DIAGNOSIS — Z79.899: ICD-10-CM

## 2021-01-09 DIAGNOSIS — Z87.19: ICD-10-CM

## 2021-01-09 DIAGNOSIS — G81.94: ICD-10-CM

## 2021-01-09 DIAGNOSIS — K21.9: ICD-10-CM

## 2021-01-09 DIAGNOSIS — E78.5: ICD-10-CM

## 2021-01-09 DIAGNOSIS — Z81.1: ICD-10-CM

## 2021-01-09 DIAGNOSIS — F17.200: ICD-10-CM

## 2021-01-09 DIAGNOSIS — Z79.84: ICD-10-CM

## 2021-01-09 DIAGNOSIS — I65.22: ICD-10-CM

## 2021-01-09 DIAGNOSIS — J44.1: ICD-10-CM

## 2021-01-09 DIAGNOSIS — M77.9: ICD-10-CM

## 2021-01-09 DIAGNOSIS — E86.1: ICD-10-CM

## 2021-01-09 DIAGNOSIS — R65.20: ICD-10-CM

## 2021-01-09 DIAGNOSIS — J96.01: ICD-10-CM

## 2021-01-09 DIAGNOSIS — E87.6: ICD-10-CM

## 2021-01-09 DIAGNOSIS — R47.1: ICD-10-CM

## 2021-01-09 DIAGNOSIS — E11.9: ICD-10-CM

## 2021-01-09 DIAGNOSIS — Z98.890: ICD-10-CM

## 2021-01-09 DIAGNOSIS — J84.10: ICD-10-CM

## 2021-01-09 DIAGNOSIS — M54.5: ICD-10-CM

## 2021-01-09 DIAGNOSIS — J44.0: ICD-10-CM

## 2021-01-09 DIAGNOSIS — I10: ICD-10-CM

## 2021-01-09 DIAGNOSIS — G89.29: ICD-10-CM

## 2021-01-09 LAB
ALBUMIN SERPL-MCNC: 3.6 G/DL (ref 3.5–5)
ALP SERPL-CCNC: 135 U/L (ref 38–126)
ALT SERPL-CCNC: 48 U/L (ref 4–49)
ANION GAP SERPL CALC-SCNC: 15 MMOL/L
APTT BLD: 22.3 SEC (ref 22–30)
AST SERPL-CCNC: 155 U/L (ref 17–59)
BASOPHILS # BLD AUTO: 0.1 K/UL (ref 0–0.2)
BASOPHILS NFR BLD AUTO: 0 %
BUN SERPL-SCNC: 17 MG/DL (ref 9–20)
CALCIUM SPEC-MCNC: 8 MG/DL (ref 8.4–10.2)
CHLORIDE SERPL-SCNC: 93 MMOL/L (ref 98–107)
CO2 SERPL-SCNC: 22 MMOL/L (ref 22–30)
D DIMER PPP FEU-MCNC: 1.34 MG/L FEU (ref ?–0.6)
EOSINOPHIL # BLD AUTO: 0.1 K/UL (ref 0–0.7)
EOSINOPHIL NFR BLD AUTO: 0 %
ERYTHROCYTE [DISTWIDTH] IN BLOOD BY AUTOMATED COUNT: 3.94 M/UL (ref 4.3–5.9)
ERYTHROCYTE [DISTWIDTH] IN BLOOD: 14 % (ref 11.5–15.5)
GLUCOSE BLD-MCNC: 222 MG/DL (ref 75–99)
GLUCOSE SERPL-MCNC: 226 MG/DL (ref 74–99)
HCT VFR BLD AUTO: 35 % (ref 39–53)
HGB BLD-MCNC: 12.1 GM/DL (ref 13–17.5)
INR PPP: 1 (ref ?–1.2)
LYMPHOCYTES # SPEC AUTO: 1.6 K/UL (ref 1–4.8)
LYMPHOCYTES NFR SPEC AUTO: 11 %
MCH RBC QN AUTO: 30.8 PG (ref 25–35)
MCHC RBC AUTO-ENTMCNC: 34.6 G/DL (ref 31–37)
MCV RBC AUTO: 89 FL (ref 80–100)
MONOCYTES # BLD AUTO: 1.1 K/UL (ref 0–1)
MONOCYTES NFR BLD AUTO: 7 %
NEUTROPHILS # BLD AUTO: 12.1 K/UL (ref 1.3–7.7)
NEUTROPHILS NFR BLD AUTO: 80 %
PLATELET # BLD AUTO: 333 K/UL (ref 150–450)
POTASSIUM SERPL-SCNC: 3.1 MMOL/L (ref 3.5–5.1)
PROT SERPL-MCNC: 6.4 G/DL (ref 6.3–8.2)
PT BLD: 10.4 SEC (ref 9–12)
SODIUM SERPL-SCNC: 130 MMOL/L (ref 137–145)
WBC # BLD AUTO: 15.1 K/UL (ref 3.8–10.6)

## 2021-01-09 PROCEDURE — 83735 ASSAY OF MAGNESIUM: CPT

## 2021-01-09 PROCEDURE — 85379 FIBRIN DEGRADATION QUANT: CPT

## 2021-01-09 PROCEDURE — 83880 ASSAY OF NATRIURETIC PEPTIDE: CPT

## 2021-01-09 PROCEDURE — 99285 EMERGENCY DEPT VISIT HI MDM: CPT

## 2021-01-09 PROCEDURE — 96367 TX/PROPH/DG ADDL SEQ IV INF: CPT

## 2021-01-09 PROCEDURE — 85730 THROMBOPLASTIN TIME PARTIAL: CPT

## 2021-01-09 PROCEDURE — 80202 ASSAY OF VANCOMYCIN: CPT

## 2021-01-09 PROCEDURE — 82607 VITAMIN B-12: CPT

## 2021-01-09 PROCEDURE — 84132 ASSAY OF SERUM POTASSIUM: CPT

## 2021-01-09 PROCEDURE — 84443 ASSAY THYROID STIM HORMONE: CPT

## 2021-01-09 PROCEDURE — 96361 HYDRATE IV INFUSION ADD-ON: CPT

## 2021-01-09 PROCEDURE — 94640 AIRWAY INHALATION TREATMENT: CPT

## 2021-01-09 PROCEDURE — 93970 EXTREMITY STUDY: CPT

## 2021-01-09 PROCEDURE — 78582 LUNG VENTILAT&PERFUS IMAGING: CPT

## 2021-01-09 PROCEDURE — 93306 TTE W/DOPPLER COMPLETE: CPT

## 2021-01-09 PROCEDURE — 80061 LIPID PANEL: CPT

## 2021-01-09 PROCEDURE — 83605 ASSAY OF LACTIC ACID: CPT

## 2021-01-09 PROCEDURE — 70450 CT HEAD/BRAIN W/O DYE: CPT

## 2021-01-09 PROCEDURE — 70496 CT ANGIOGRAPHY HEAD: CPT

## 2021-01-09 PROCEDURE — 96366 THER/PROPH/DIAG IV INF ADDON: CPT

## 2021-01-09 PROCEDURE — 70498 CT ANGIOGRAPHY NECK: CPT

## 2021-01-09 PROCEDURE — 93005 ELECTROCARDIOGRAM TRACING: CPT

## 2021-01-09 PROCEDURE — 94760 N-INVAS EAR/PLS OXIMETRY 1: CPT

## 2021-01-09 PROCEDURE — 96365 THER/PROPH/DIAG IV INF INIT: CPT

## 2021-01-09 PROCEDURE — 36415 COLL VENOUS BLD VENIPUNCTURE: CPT

## 2021-01-09 PROCEDURE — 80048 BASIC METABOLIC PNL TOTAL CA: CPT

## 2021-01-09 PROCEDURE — 85025 COMPLETE CBC W/AUTO DIFF WBC: CPT

## 2021-01-09 PROCEDURE — 84484 ASSAY OF TROPONIN QUANT: CPT

## 2021-01-09 PROCEDURE — 71045 X-RAY EXAM CHEST 1 VIEW: CPT

## 2021-01-09 PROCEDURE — 80053 COMPREHEN METABOLIC PANEL: CPT

## 2021-01-09 PROCEDURE — 82565 ASSAY OF CREATININE: CPT

## 2021-01-09 PROCEDURE — 85610 PROTHROMBIN TIME: CPT

## 2021-01-09 PROCEDURE — 87636 SARSCOV2 & INF A&B AMP PRB: CPT

## 2021-01-09 PROCEDURE — 96368 THER/DIAG CONCURRENT INF: CPT

## 2021-01-09 PROCEDURE — 83036 HEMOGLOBIN GLYCOSYLATED A1C: CPT

## 2021-01-09 PROCEDURE — 71250 CT THORAX DX C-: CPT

## 2021-01-09 PROCEDURE — 82746 ASSAY OF FOLIC ACID SERUM: CPT

## 2021-01-09 PROCEDURE — 87040 BLOOD CULTURE FOR BACTERIA: CPT

## 2021-01-09 RX ADMIN — HEPARIN SODIUM SCH MLS/HR: 10000 INJECTION, SOLUTION INTRAVENOUS at 21:22

## 2021-01-09 RX ADMIN — ALBUTEROL SULFATE PRN PUFF: 90 AEROSOL, METERED RESPIRATORY (INHALATION) at 21:05

## 2021-01-09 RX ADMIN — POTASSIUM CHLORIDE SCH MLS/HR: 7.46 INJECTION, SOLUTION INTRAVENOUS at 18:08

## 2021-01-09 RX ADMIN — INSULIN ASPART SCH UNIT: 100 INJECTION, SOLUTION INTRAVENOUS; SUBCUTANEOUS at 21:29

## 2021-01-09 RX ADMIN — CEFAZOLIN SCH MLS/HR: 330 INJECTION, POWDER, FOR SOLUTION INTRAMUSCULAR; INTRAVENOUS at 14:48

## 2021-01-09 RX ADMIN — HYDROCODONE BITARTRATE AND ACETAMINOPHEN PRN EACH: 10; 325 TABLET ORAL at 21:28

## 2021-01-09 RX ADMIN — POTASSIUM CHLORIDE SCH MLS/HR: 7.46 INJECTION, SOLUTION INTRAVENOUS at 16:06

## 2021-01-09 RX ADMIN — GABAPENTIN SCH MG: 300 CAPSULE ORAL at 21:28
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
## 2021-01-09 NOTE — XR
EXAMINATION TYPE: XR chest 1V portable

 

DATE OF EXAM: 1/9/2021

 

COMPARISON: 12/20/2020

 

HISTORY: Short of breath

 

TECHNIQUE: Single view

 

FINDINGS: There is blunting of the costophrenic angles and poor inspiration. There is coarsening of i
nterstitial markings. There is no gross heart failure.

 

IMPRESSION: There is pleural reaction and atelectasis at the lung bases unchanged compared to old exa
m. No heart failure seen. Pulmonary fibrosis.

## 2021-01-09 NOTE — NM
EXAMINATION TYPE: NM pul vent and perfuse

 

DATE OF EXAM: 1/9/2021

 

COMPARISON: NONE

 

HISTORY:

 

TECHNIQUE:  Utilizing inhalation of 38.8 mCi Tc 99m DTPA aerosol and intravenous injection of 5.1 mCi
 of Tc 99m MAA, ventilation and perfusion images are acquired post injection in multiple projections.


 

FINDINGS: 

There are large matching defects involving both upper lobes. There is patchy abnormal decreased venti
lation and perfusion in both lower lobes. Ventilation abnormalities appear larger than the perfusion 
abnormalities..

 

IMPRESSION: 

Numerous matching large defects consistent with severe airway disease. There is intermediate probabil
ity of pulmonary embolism.

## 2021-01-10 LAB
ANION GAP SERPL CALC-SCNC: 3 MMOL/L
APTT BLD: 22.5 SEC (ref 22–30)
BASOPHILS # BLD AUTO: 0 K/UL (ref 0–0.2)
BASOPHILS NFR BLD AUTO: 0 %
BUN SERPL-SCNC: 21 MG/DL (ref 9–20)
CALCIUM SPEC-MCNC: 7.6 MG/DL (ref 8.4–10.2)
CHLORIDE SERPL-SCNC: 100 MMOL/L (ref 98–107)
CO2 SERPL-SCNC: 29 MMOL/L (ref 22–30)
EOSINOPHIL # BLD AUTO: 0.1 K/UL (ref 0–0.7)
EOSINOPHIL NFR BLD AUTO: 1 %
ERYTHROCYTE [DISTWIDTH] IN BLOOD BY AUTOMATED COUNT: 3.64 M/UL (ref 4.3–5.9)
ERYTHROCYTE [DISTWIDTH] IN BLOOD: 14.2 % (ref 11.5–15.5)
GLUCOSE BLD-MCNC: 178 MG/DL (ref 75–99)
GLUCOSE BLD-MCNC: 214 MG/DL (ref 75–99)
GLUCOSE BLD-MCNC: 263 MG/DL (ref 75–99)
GLUCOSE BLD-MCNC: 385 MG/DL (ref 75–99)
GLUCOSE SERPL-MCNC: 251 MG/DL (ref 74–99)
HCT VFR BLD AUTO: 32.4 % (ref 39–53)
HGB BLD-MCNC: 10.9 GM/DL (ref 13–17.5)
INR PPP: 1 (ref ?–1.2)
LYMPHOCYTES # SPEC AUTO: 1.1 K/UL (ref 1–4.8)
LYMPHOCYTES NFR SPEC AUTO: 10 %
MAGNESIUM SPEC-SCNC: 1.7 MG/DL (ref 1.6–2.3)
MCH RBC QN AUTO: 30.1 PG (ref 25–35)
MCHC RBC AUTO-ENTMCNC: 33.8 G/DL (ref 31–37)
MCV RBC AUTO: 89.1 FL (ref 80–100)
MONOCYTES # BLD AUTO: 0.6 K/UL (ref 0–1)
MONOCYTES NFR BLD AUTO: 6 %
NEUTROPHILS # BLD AUTO: 9 K/UL (ref 1.3–7.7)
NEUTROPHILS NFR BLD AUTO: 81 %
PLATELET # BLD AUTO: 254 K/UL (ref 150–450)
POTASSIUM SERPL-SCNC: 3.1 MMOL/L (ref 3.5–5.1)
POTASSIUM SERPL-SCNC: 3.2 MMOL/L (ref 3.5–5.1)
PT BLD: 10.6 SEC (ref 9–12)
SODIUM SERPL-SCNC: 132 MMOL/L (ref 137–145)
WBC # BLD AUTO: 11 K/UL (ref 3.8–10.6)

## 2021-01-10 RX ADMIN — SODIUM CHLORIDE SCH MLS/HR: 9 INJECTION, SOLUTION INTRAVENOUS at 13:28

## 2021-01-10 RX ADMIN — CEFAZOLIN SCH MLS/HR: 330 INJECTION, POWDER, FOR SOLUTION INTRAMUSCULAR; INTRAVENOUS at 08:35

## 2021-01-10 RX ADMIN — ALBUTEROL SULFATE PRN PUFF: 90 AEROSOL, METERED RESPIRATORY (INHALATION) at 07:49

## 2021-01-10 RX ADMIN — POTASSIUM CHLORIDE SCH MEQ: 20 TABLET, EXTENDED RELEASE ORAL at 05:26

## 2021-01-10 RX ADMIN — GABAPENTIN SCH MG: 300 CAPSULE ORAL at 08:34

## 2021-01-10 RX ADMIN — TIOTROPIUM BROMIDE INHALATION SPRAY SCH PUFF: 3.12 SPRAY, METERED RESPIRATORY (INHALATION) at 07:50

## 2021-01-10 RX ADMIN — BUDESONIDE AND FORMOTEROL FUMARATE DIHYDRATE SCH PUFF: 160; 4.5 AEROSOL RESPIRATORY (INHALATION) at 19:47

## 2021-01-10 RX ADMIN — ATORVASTATIN CALCIUM SCH MG: 20 TABLET, FILM COATED ORAL at 08:34

## 2021-01-10 RX ADMIN — ALBUTEROL SULFATE PRN PUFF: 90 AEROSOL, METERED RESPIRATORY (INHALATION) at 19:48

## 2021-01-10 RX ADMIN — ALBUTEROL SULFATE PRN PUFF: 90 AEROSOL, METERED RESPIRATORY (INHALATION) at 12:31

## 2021-01-10 RX ADMIN — CEFAZOLIN SCH: 330 INJECTION, POWDER, FOR SOLUTION INTRAMUSCULAR; INTRAVENOUS at 01:30

## 2021-01-10 RX ADMIN — POTASSIUM CHLORIDE SCH MLS/HR: 7.46 INJECTION, SOLUTION INTRAVENOUS at 13:29

## 2021-01-10 RX ADMIN — HEPARIN SODIUM SCH: 10000 INJECTION, SOLUTION INTRAVENOUS at 16:44

## 2021-01-10 RX ADMIN — INSULIN ASPART SCH UNIT: 100 INJECTION, SOLUTION INTRAVENOUS; SUBCUTANEOUS at 20:32

## 2021-01-10 RX ADMIN — PIPERACILLIN AND TAZOBACTAM SCH MLS/HR: 3; .375 INJECTION, POWDER, FOR SOLUTION INTRAVENOUS at 17:06

## 2021-01-10 RX ADMIN — HYDROCODONE BITARTRATE AND ACETAMINOPHEN PRN EACH: 10; 325 TABLET ORAL at 17:07

## 2021-01-10 RX ADMIN — POTASSIUM CHLORIDE SCH MEQ: 20 TABLET, EXTENDED RELEASE ORAL at 06:38

## 2021-01-10 RX ADMIN — POTASSIUM CHLORIDE SCH MLS/HR: 7.46 INJECTION, SOLUTION INTRAVENOUS at 10:50

## 2021-01-10 RX ADMIN — GABAPENTIN SCH MG: 100 CAPSULE ORAL at 20:33

## 2021-01-10 RX ADMIN — HYDROCODONE BITARTRATE AND ACETAMINOPHEN PRN EACH: 10; 325 TABLET ORAL at 03:27

## 2021-01-10 RX ADMIN — INSULIN ASPART SCH UNIT: 100 INJECTION, SOLUTION INTRAVENOUS; SUBCUTANEOUS at 17:20

## 2021-01-10 RX ADMIN — INSULIN ASPART SCH UNIT: 100 INJECTION, SOLUTION INTRAVENOUS; SUBCUTANEOUS at 06:38

## 2021-01-10 RX ADMIN — ALBUTEROL SULFATE PRN PUFF: 90 AEROSOL, METERED RESPIRATORY (INHALATION) at 16:19

## 2021-01-10 RX ADMIN — POTASSIUM CHLORIDE SCH MLS/HR: 7.46 INJECTION, SOLUTION INTRAVENOUS at 19:02

## 2021-01-10 RX ADMIN — PIPERACILLIN AND TAZOBACTAM SCH MLS/HR: 3; .375 INJECTION, POWDER, FOR SOLUTION INTRAVENOUS at 23:24

## 2021-01-10 RX ADMIN — INSULIN ASPART SCH UNIT: 100 INJECTION, SOLUTION INTRAVENOUS; SUBCUTANEOUS at 13:29

## 2021-01-10 RX ADMIN — CEFAZOLIN SCH: 330 INJECTION, POWDER, FOR SOLUTION INTRAMUSCULAR; INTRAVENOUS at 17:09

## 2021-01-10 RX ADMIN — LEVOTHYROXINE SODIUM SCH MCG: 50 TABLET ORAL at 05:26

## 2021-01-10 RX ADMIN — POTASSIUM CHLORIDE SCH MLS/HR: 7.46 INJECTION, SOLUTION INTRAVENOUS at 17:07
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
## 2021-01-10 NOTE — US
EXAMINATION TYPE: US venous doppler duplex LE 

 

DATE OF EXAM: 1/10/2021 9:39 AM

 

COMPARISON: NONE

 

CLINICAL HISTORY: dvt abnormal vq scan. patient is agitated and very difficult to understand, no leg 
swelling, possible PE

 

SIDE PERFORMED: Bialteral  

 

TECHNIQUE:  The lower extremity deep venous system is examined utilizing real time linear array sonog
yocasta with graded compression, doppler sonography and color-flow sonography.

 

VESSELS IMAGED:

Common Femoral Vein

Deep Femoral Vein

Greater Saphenous Vein *

Femoral Vein

Popliteal Vein

Small Saphenous Vein *

Proximal Calf Veins

(* superficial vessels)

 

 

 

Right Leg:  Negative for DVT** 4.2cm fluid collection noted laterally in right popiteal fossa

 

Left Leg:  Negative for DVT

 

 

 

IMPRESSION:  

1. Grayscale, color doppler, spectral doppler imaging performed of the deep veins of the lower extrem
ities.  There is normal flow, compressibility, vascular waveforms.

2. There is a nonspecific 4.2 cm popliteal fossa fluid collection in the right likely related to the 
popliteal fossa cyst.

## 2021-01-10 NOTE — CT
EXAMINATION TYPE: CT chest wo con

 

DATE OF EXAM: 1/10/2021

 

COMPARISON:

 

HISTORY: ILD/Pulmonary Fibrosis

 

CT DLP: 303.4 mGycm.  Automated Exposure Control for Dose Reduction was Utilized.

 

TECHNIQUE:  CT scan of the thorax is performed without IV contrast.

 

FINDINGS:

 

LUNGS: There is bilateral groundglass changes and areas of consolidation. Underlying COPD noted with 
no pneumothorax. Along the diaphragm suggestion of pleural calcification. Mild interlobular septal th
ickening at the lung bases..

 

MEDIASTINUM: Lack of IV contrast is noted to limit evaluation for mediastinal and especially hilar ad
enopathy. There are no definitive greater than 1 cm hilar or mediastinal lymph nodes.   No cardiomega
ly or pericardial effusion is seen. Atherosclerotic change of the aorta. Calcification near the aorti
c valve. Coronary artery calcification noted.

 

OTHER: Surgical clips in the gallbladder fossa..

 

 

IMPRESSION: 

1. COPD with patchy groundglass bilateral areas of infiltrate correlate for pneumonia.

2. Changes of mild basilar interlobular septal thickening and interstitial lung disease.

## 2021-01-11 LAB
ALBUMIN SERPL-MCNC: 2.8 G/DL (ref 3.5–5)
ALP SERPL-CCNC: 122 U/L (ref 38–126)
ALT SERPL-CCNC: 67 U/L (ref 4–49)
ANION GAP SERPL CALC-SCNC: 1 MMOL/L
AST SERPL-CCNC: 162 U/L (ref 17–59)
BUN SERPL-SCNC: 13 MG/DL (ref 9–20)
CALCIUM SPEC-MCNC: 7.8 MG/DL (ref 8.4–10.2)
CHLORIDE SERPL-SCNC: 104 MMOL/L (ref 98–107)
CO2 SERPL-SCNC: 31 MMOL/L (ref 22–30)
GLUCOSE BLD-MCNC: 172 MG/DL (ref 75–99)
GLUCOSE BLD-MCNC: 201 MG/DL (ref 75–99)
GLUCOSE BLD-MCNC: 211 MG/DL (ref 75–99)
GLUCOSE BLD-MCNC: 250 MG/DL (ref 75–99)
GLUCOSE SERPL-MCNC: 145 MG/DL (ref 74–99)
MAGNESIUM SPEC-SCNC: 1.8 MG/DL (ref 1.6–2.3)
POTASSIUM SERPL-SCNC: 3.9 MMOL/L (ref 3.5–5.1)
PROT SERPL-MCNC: 5.4 G/DL (ref 6.3–8.2)
SODIUM SERPL-SCNC: 136 MMOL/L (ref 137–145)
VANCOMYCIN SERPL-MCNC: 25.9 UG/ML

## 2021-01-11 RX ADMIN — INSULIN ASPART SCH UNIT: 100 INJECTION, SOLUTION INTRAVENOUS; SUBCUTANEOUS at 06:11

## 2021-01-11 RX ADMIN — ALBUTEROL SULFATE PRN PUFF: 90 AEROSOL, METERED RESPIRATORY (INHALATION) at 19:29

## 2021-01-11 RX ADMIN — GABAPENTIN SCH MG: 100 CAPSULE ORAL at 21:47

## 2021-01-11 RX ADMIN — PIPERACILLIN AND TAZOBACTAM SCH MLS/HR: 3; .375 INJECTION, POWDER, FOR SOLUTION INTRAVENOUS at 22:46

## 2021-01-11 RX ADMIN — HYDROCODONE BITARTRATE AND ACETAMINOPHEN PRN EACH: 10; 325 TABLET ORAL at 17:45

## 2021-01-11 RX ADMIN — LEVOTHYROXINE SODIUM SCH MCG: 50 TABLET ORAL at 06:12

## 2021-01-11 RX ADMIN — INSULIN ASPART SCH UNIT: 100 INJECTION, SOLUTION INTRAVENOUS; SUBCUTANEOUS at 17:32

## 2021-01-11 RX ADMIN — INSULIN ASPART SCH UNIT: 100 INJECTION, SOLUTION INTRAVENOUS; SUBCUTANEOUS at 21:47

## 2021-01-11 RX ADMIN — BUDESONIDE AND FORMOTEROL FUMARATE DIHYDRATE SCH PUFF: 160; 4.5 AEROSOL RESPIRATORY (INHALATION) at 19:29

## 2021-01-11 RX ADMIN — CEFAZOLIN SCH MLS/HR: 330 INJECTION, POWDER, FOR SOLUTION INTRAMUSCULAR; INTRAVENOUS at 03:37

## 2021-01-11 RX ADMIN — PIPERACILLIN AND TAZOBACTAM SCH MLS/HR: 3; .375 INJECTION, POWDER, FOR SOLUTION INTRAVENOUS at 17:31

## 2021-01-11 RX ADMIN — SODIUM CHLORIDE SCH MLS/HR: 9 INJECTION, SOLUTION INTRAVENOUS at 03:38

## 2021-01-11 RX ADMIN — CEFAZOLIN SCH MLS/HR: 330 INJECTION, POWDER, FOR SOLUTION INTRAMUSCULAR; INTRAVENOUS at 21:47

## 2021-01-11 RX ADMIN — TIOTROPIUM BROMIDE INHALATION SPRAY SCH: 3.12 SPRAY, METERED RESPIRATORY (INHALATION) at 09:08

## 2021-01-11 RX ADMIN — NICOTINE SCH PATCH: 14 PATCH, EXTENDED RELEASE TRANSDERMAL at 12:24

## 2021-01-11 RX ADMIN — BUDESONIDE AND FORMOTEROL FUMARATE DIHYDRATE SCH: 160; 4.5 AEROSOL RESPIRATORY (INHALATION) at 09:08

## 2021-01-11 RX ADMIN — INSULIN ASPART SCH UNIT: 100 INJECTION, SOLUTION INTRAVENOUS; SUBCUTANEOUS at 12:24

## 2021-01-11 RX ADMIN — PIPERACILLIN AND TAZOBACTAM SCH MLS/HR: 3; .375 INJECTION, POWDER, FOR SOLUTION INTRAVENOUS at 08:39

## 2021-01-11 RX ADMIN — GABAPENTIN SCH MG: 100 CAPSULE ORAL at 08:38

## 2021-01-11 RX ADMIN — ATORVASTATIN CALCIUM SCH MG: 20 TABLET, FILM COATED ORAL at 08:38

## 2021-01-11 RX ADMIN — SODIUM CHLORIDE SCH MLS/HR: 9 INJECTION, SOLUTION INTRAVENOUS at 14:13

## 2021-01-11 RX ADMIN — CEFAZOLIN SCH: 330 INJECTION, POWDER, FOR SOLUTION INTRAMUSCULAR; INTRAVENOUS at 12:24

## 2021-01-11 NOTE — P.PN
Subjective


Progress Note Date: 01/11/21


This is a 75-year-old gentleman admitted with acute COPD exacerbation, 

interstitial lung disease, healthcare acquired pneumonia, acute hypoxic 

respiratory failure and multiple other medical issues.  Maintained on nebulized 

bronchodilators, Zosyn, vancomycin.  Remains off of metformin secondary to her 

renal failure, renal function improving, creatinine down to 1.01.  Blood sugars 

elevated in the 200s Sodium improving with IV fluid hydration, up to 136.  

Potassium 3.9, normal magnesium.  AST/ALT mildly trending up, T bili within 

normal limits.  Maintaining O2 sats in the high 90s on 3 L nasal cannula.  

Afebrile.








Objective





- Vital Signs


Vital signs: 


                                   Vital Signs











Temp  98.2 F   01/11/21 15:34


 


Pulse  81   01/11/21 15:34


 


Resp  18   01/11/21 15:34


 


BP  143/76   01/11/21 15:34


 


Pulse Ox  95   01/11/21 15:34








                                 Intake & Output











 01/10/21 01/11/21 01/11/21





 18:59 06:59 18:59


 


Intake Total  125 472


 


Output Total  500 350


 


Balance  -375 122


 


Weight  80 kg 


 


Intake:   


 


  Oral  125 472


 


Output:   


 


  Urine  500 350


 


Other:   


 


  Voiding Method  Incontinent Incontinent


 


  # Voids 1 1 














- Exam


PHYSICAL EXAMINATION: 





GENERAL: Sitting up in bed, alert and oriented x3, no acute distress, mumbles. 


HEENT: Pupils are round and equally reacting to light. EOMI. No scleral icterus.

No conjunctival pallor. Normocephalic, atraumatic. No pharyngeal erythema. No 

thyromegaly. 


CARDIOVASCULAR: S1 and S2 present. No murmurs, rubs, or gallops. 


PULMONARY: Chest is clear to auscultation, fine bibasilar crackles, minimal 

expiratory wheezing


ABDOMEN: Soft, nontender, nondistended, normoactive bowel sounds. No palpable 

organomegaly. 


MUSCULOSKELETAL: No joint swelling or deformity.


EXTREMITIES: No cyanosis, clubbing, or pedal edema. 


NEUROLOGICAL: Gross neurological examination did not reveal any focal deficits. 


SKIN: No rashes. 











- Labs


CBC & Chem 7: 


                                 01/10/21 03:16





                                 01/11/21 07:03


Labs: 


                  Abnormal Lab Results - Last 24 Hours (Table)











  01/10/21 01/10/21 01/11/21 Range/Units





  17:08 19:51 06:09 


 


Sodium     (137-145)  mmol/L


 


Carbon Dioxide     (22-30)  mmol/L


 


Glucose     (74-99)  mg/dL


 


POC Glucose (mg/dL)  214 H  263 H  172 H  (75-99)  mg/dL


 


Calcium     (8.4-10.2)  mg/dL


 


AST     (17-59)  U/L


 


ALT     (4-49)  U/L


 


Total Protein     (6.3-8.2)  g/dL


 


Albumin     (3.5-5.0)  g/dL














  01/11/21 01/11/21 Range/Units





  07:03 11:35 


 


Sodium  136 L   (137-145)  mmol/L


 


Carbon Dioxide  31 H   (22-30)  mmol/L


 


Glucose  145 H   (74-99)  mg/dL


 


POC Glucose (mg/dL)   250 H  (75-99)  mg/dL


 


Calcium  7.8 L   (8.4-10.2)  mg/dL


 


AST  162 H   (17-59)  U/L


 


ALT  67 H   (4-49)  U/L


 


Total Protein  5.4 L   (6.3-8.2)  g/dL


 


Albumin  2.8 L   (3.5-5.0)  g/dL








                      Microbiology - Last 24 Hours (Table)











 01/09/21 14:59 Blood Culture - Preliminary





 Blood    No Growth after 24 hours














Assessment and Plan


Assessment: 


-Shortness of breath, acute hypoxic respiratory failure secondary to severe 

healthcare-acquired pneumonia, acute COPD exacerbation


-Acute renal failure prerenal azotemia, improving with IV fluid hydration


-Hypokalemia and hypomagnesemia these electrolytes will be replaced and the 

hypokalemia secondary to IV fluids


-Mildly elevated troponin secondary to hypoxemia.  Troponin trended downwards


-Hyponatremia hypovolemic hyponatremia, improved with IV fluid hydration


-Interstitial lung disease


-Type 2 diabetes mellitus


-hypothyroidism


-Diabetes mellitus


-Gastroesophageal reflux disease


-Hyperlipidemia


-History of GI bleed














Plan: Continue on current medication regime, monitoring and symptomatically 

treatment.  Maintain IV antibiotics, nebulized bronchodilators.  Nicotine patch 

added to med regimen, smoking sensation reinforced.  PT/OT consulted.

















The impression and plan of care has been dictated as directed.





:


I performed a history and examination of this patient,  discussed the same with 

the dictator.  I agree with the dictator's note ,documented as a scribe.  Any 

additional findings or plans will be noted.
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
## 2021-01-12 LAB
ANION GAP SERPL CALC-SCNC: 2 MMOL/L
BASOPHILS # BLD AUTO: 0 K/UL (ref 0–0.2)
BASOPHILS NFR BLD AUTO: 0 %
BUN SERPL-SCNC: 13 MG/DL (ref 9–20)
CALCIUM SPEC-MCNC: 8.1 MG/DL (ref 8.4–10.2)
CHLORIDE SERPL-SCNC: 106 MMOL/L (ref 98–107)
CO2 SERPL-SCNC: 31 MMOL/L (ref 22–30)
EOSINOPHIL # BLD AUTO: 0.2 K/UL (ref 0–0.7)
EOSINOPHIL NFR BLD AUTO: 2 %
ERYTHROCYTE [DISTWIDTH] IN BLOOD BY AUTOMATED COUNT: 3.1 M/UL (ref 4.3–5.9)
ERYTHROCYTE [DISTWIDTH] IN BLOOD: 14.4 % (ref 11.5–15.5)
GLUCOSE BLD-MCNC: 156 MG/DL (ref 75–99)
GLUCOSE BLD-MCNC: 200 MG/DL (ref 75–99)
GLUCOSE BLD-MCNC: 213 MG/DL (ref 75–99)
GLUCOSE BLD-MCNC: 254 MG/DL (ref 75–99)
GLUCOSE BLD-MCNC: 262 MG/DL (ref 75–99)
GLUCOSE SERPL-MCNC: 144 MG/DL (ref 74–99)
HCT VFR BLD AUTO: 28.3 % (ref 39–53)
HGB BLD-MCNC: 9.5 GM/DL (ref 13–17.5)
LYMPHOCYTES # SPEC AUTO: 1.5 K/UL (ref 1–4.8)
LYMPHOCYTES NFR SPEC AUTO: 17 %
MCH RBC QN AUTO: 30.5 PG (ref 25–35)
MCHC RBC AUTO-ENTMCNC: 33.4 G/DL (ref 31–37)
MCV RBC AUTO: 91.3 FL (ref 80–100)
MONOCYTES # BLD AUTO: 0.5 K/UL (ref 0–1)
MONOCYTES NFR BLD AUTO: 6 %
NEUTROPHILS # BLD AUTO: 6.3 K/UL (ref 1.3–7.7)
NEUTROPHILS NFR BLD AUTO: 72 %
PLATELET # BLD AUTO: 285 K/UL (ref 150–450)
POTASSIUM SERPL-SCNC: 3.8 MMOL/L (ref 3.5–5.1)
SODIUM SERPL-SCNC: 139 MMOL/L (ref 137–145)
WBC # BLD AUTO: 8.8 K/UL (ref 3.8–10.6)

## 2021-01-12 RX ADMIN — IPRATROPIUM BROMIDE AND ALBUTEROL SULFATE SCH: .5; 3 SOLUTION RESPIRATORY (INHALATION) at 12:47

## 2021-01-12 RX ADMIN — ALBUTEROL SULFATE PRN PUFF: 90 AEROSOL, METERED RESPIRATORY (INHALATION) at 09:55

## 2021-01-12 RX ADMIN — PIPERACILLIN AND TAZOBACTAM SCH MLS/HR: 3; .375 INJECTION, POWDER, FOR SOLUTION INTRAVENOUS at 18:03

## 2021-01-12 RX ADMIN — LEVOTHYROXINE SODIUM SCH MCG: 50 TABLET ORAL at 06:22

## 2021-01-12 RX ADMIN — ACETAMINOPHEN SCH ML: 160 SOLUTION ORAL at 20:27

## 2021-01-12 RX ADMIN — PIPERACILLIN AND TAZOBACTAM SCH MLS/HR: 3; .375 INJECTION, POWDER, FOR SOLUTION INTRAVENOUS at 09:55

## 2021-01-12 RX ADMIN — ATORVASTATIN CALCIUM SCH MG: 20 TABLET, FILM COATED ORAL at 09:55

## 2021-01-12 RX ADMIN — INSULIN ASPART SCH UNIT: 100 INJECTION, SOLUTION INTRAVENOUS; SUBCUTANEOUS at 06:22

## 2021-01-12 RX ADMIN — PANTOPRAZOLE SODIUM SCH MG: 40 INJECTION, POWDER, FOR SOLUTION INTRAVENOUS at 12:41

## 2021-01-12 RX ADMIN — PIPERACILLIN AND TAZOBACTAM SCH MLS/HR: 3; .375 INJECTION, POWDER, FOR SOLUTION INTRAVENOUS at 23:11

## 2021-01-12 RX ADMIN — INSULIN ASPART SCH UNIT: 100 INJECTION, SOLUTION INTRAVENOUS; SUBCUTANEOUS at 12:41

## 2021-01-12 RX ADMIN — GABAPENTIN SCH MG: 100 CAPSULE ORAL at 09:55

## 2021-01-12 RX ADMIN — IPRATROPIUM BROMIDE AND ALBUTEROL SULFATE SCH ML: .5; 3 SOLUTION RESPIRATORY (INHALATION) at 16:09

## 2021-01-12 RX ADMIN — HYDROCODONE BITARTRATE AND ACETAMINOPHEN PRN EACH: 10; 325 TABLET ORAL at 06:23

## 2021-01-12 RX ADMIN — GABAPENTIN SCH MG: 100 CAPSULE ORAL at 20:27

## 2021-01-12 RX ADMIN — IPRATROPIUM BROMIDE AND ALBUTEROL SULFATE SCH ML: .5; 3 SOLUTION RESPIRATORY (INHALATION) at 20:14

## 2021-01-12 RX ADMIN — INSULIN ASPART SCH UNIT: 100 INJECTION, SOLUTION INTRAVENOUS; SUBCUTANEOUS at 18:04

## 2021-01-12 RX ADMIN — SODIUM CHLORIDE SCH MLS/HR: 9 INJECTION, SOLUTION INTRAVENOUS at 01:17

## 2021-01-12 RX ADMIN — TIOTROPIUM BROMIDE INHALATION SPRAY SCH PUFF: 3.12 SPRAY, METERED RESPIRATORY (INHALATION) at 09:56

## 2021-01-12 RX ADMIN — ACETAMINOPHEN SCH ML: 160 SOLUTION ORAL at 16:36

## 2021-01-12 RX ADMIN — NICOTINE SCH PATCH: 14 PATCH, EXTENDED RELEASE TRANSDERMAL at 09:55

## 2021-01-12 RX ADMIN — BUDESONIDE AND FORMOTEROL FUMARATE DIHYDRATE SCH PUFF: 160; 4.5 AEROSOL RESPIRATORY (INHALATION) at 09:55

## 2021-01-12 RX ADMIN — SODIUM CHLORIDE SCH MLS/HR: 9 INJECTION, SOLUTION INTRAVENOUS at 16:27

## 2021-01-12 RX ADMIN — BUDESONIDE AND FORMOTEROL FUMARATE DIHYDRATE SCH PUFF: 160; 4.5 AEROSOL RESPIRATORY (INHALATION) at 20:14

## 2021-01-12 RX ADMIN — CEFAZOLIN SCH: 330 INJECTION, POWDER, FOR SOLUTION INTRAMUSCULAR; INTRAVENOUS at 05:04

## 2021-01-12 RX ADMIN — CEFAZOLIN SCH MLS/HR: 330 INJECTION, POWDER, FOR SOLUTION INTRAMUSCULAR; INTRAVENOUS at 16:27

## 2021-01-12 RX ADMIN — INSULIN ASPART SCH UNIT: 100 INJECTION, SOLUTION INTRAVENOUS; SUBCUTANEOUS at 21:07

## 2021-01-12 RX ADMIN — HYDROCODONE BITARTRATE AND ACETAMINOPHEN PRN EACH: 10; 325 TABLET ORAL at 20:29

## 2021-01-12 NOTE — P.PN
Subjective


Progress Note Date: 01/12/21


This is a 75-year-old gentleman admitted with acute COPD exacerbation, 

interstitial lung disease, healthcare acquired pneumonia, acute hypoxic 

respiratory failure and multiple other medical issues.  Maintained on nebulized 

bronchodilators, Zosyn, vancomycin.  Remains off of metformin secondary to her 

renal failure, renal function improving, creatinine down to 1.01.  Blood sugars 

elevated in the 200s Sodium improving with IV fluid hydration, up to 136.  

Potassium 3.9, normal magnesium.  AST/ALT mildly trending up, T bili within 

normal limits.  Maintaining O2 sats in the high 90s on 3 L nasal cannula.  

Afebrile.








01/12/2021  breathing improving, oxygen weaned down to 2 L nasal cannula, 

maintaining O2 sats in the 90s.  Maintained on Zosyn, vancomycin.  Creatinine 

down to 0.9.Occasional cough.Complains of mouth discomfort, mostly right lower 

jaw, secondary to his dentures-state wife will be bringing him denture cream.  

Consuming  50% with no nausea vomiting or diarrhea.  No abdominal pain.  Blood 

sugars controlled.  Afebrile, normal WBC.





Objective





- Vital Signs


Vital signs: 


                                   Vital Signs











Temp  98.3 F   01/12/21 08:20


 


Pulse  75   01/12/21 08:20


 


Resp  18   01/12/21 08:20


 


BP  121/71   01/12/21 08:20


 


Pulse Ox  97   01/12/21 09:56








                                 Intake & Output











 01/11/21 01/12/21 01/12/21





 18:59 06:59 18:59


 


Intake Total 472 476 


 


Output Total 675 350 


 


Balance -203 126 


 


Weight  82.5 kg 


 


Intake:   


 


  Oral 472 476 


 


Output:   


 


  Urine 675 350 


 


Other:   


 


  Voiding Method Incontinent Diaper Diaper





  Incontinent Incontinent


 


  # Voids 1 1 














- Exam


PHYSICAL EXAMINATION: 





GENERAL: Sitting up in bed, alert and oriented x3, no acute distress, mumbles.  

Requires assistance with sitting up.


HEENT: Pupils are round and equally reacting to light. EOMI. No scleral icterus.

No conjunctival pallor. Normocephalic, atraumatic. No pharyngeal erythema. No 

thyromegaly. 


CARDIOVASCULAR: S1 and S2 present. No murmurs, rubs, or gallops. 


PULMONARY: Chest is clear to auscultation, scattered rhonchi


ABDOMEN: Soft, nontender, nondistended, normoactive bowel sounds. No palpable 

organomegaly. 


MUSCULOSKELETAL: No joint swelling or deformity.


EXTREMITIES: No cyanosis, clubbing, or pedal edema. 


NEUROLOGICAL: Gross neurological examination did not reveal any focal deficits. 


SKIN: No rashes. 











- Labs


CBC & Chem 7: 


                                 01/12/21 07:55





                                 01/12/21 07:55


Labs: 


                  Abnormal Lab Results - Last 24 Hours (Table)











  01/11/21 01/11/21 01/11/21 Range/Units





  11:35 17:16 21:17 


 


RBC     (4.30-5.90)  m/uL


 


Hgb     (13.0-17.5)  gm/dL


 


Hct     (39.0-53.0)  %


 


Carbon Dioxide     (22-30)  mmol/L


 


Glucose     (74-99)  mg/dL


 


POC Glucose (mg/dL)  250 H  211 H  201 H  (75-99)  mg/dL


 


Calcium     (8.4-10.2)  mg/dL














  01/12/21 01/12/21 01/12/21 Range/Units





  06:12 07:55 07:55 


 


RBC   3.10 L   (4.30-5.90)  m/uL


 


Hgb   9.5 L   (13.0-17.5)  gm/dL


 


Hct   28.3 L   (39.0-53.0)  %


 


Carbon Dioxide    31 H  (22-30)  mmol/L


 


Glucose    144 H  (74-99)  mg/dL


 


POC Glucose (mg/dL)  156 H    (75-99)  mg/dL


 


Calcium    8.1 L  (8.4-10.2)  mg/dL








                      Microbiology - Last 24 Hours (Table)











 01/09/21 14:59 Blood Culture - Preliminary





 Blood    No Growth after 48 hours














Assessment and Plan


Assessment: 


-Shortness of breath, acute hypoxic respiratory failure secondary to severe 

healthcare-acquired pneumonia, acute COPD exacerbation


-Acute renal failure prerenal azotemia, improving with IV fluid hydration


-Hypokalemia and hypomagnesemia these electrolytes will be replaced and the 

hypokalemia secondary to IV fluids


-Mildly elevated troponin secondary to hypoxemia.  Troponin trended downwards


-Hyponatremia hypovolemic hyponatremia, improved with IV fluid hydration


-Interstitial lung disease


-Type 2 diabetes mellitus


-hypothyroidism


-Diabetes mellitus


-Gastroesophageal reflux disease


-Hyperlipidemia


-History of GI bleed














Plan: Continue on current medication regime, monitoring and symptomatically 

treatment.  Lidocaine section swallow for mouth pain secondary to dentures-wife 

to bring in denture cream. Continue IV antibiotics, nebulized bronchodilators.  

Close monitoring of renal function, electrolytes, hemoglobin with repeat labs 

ordered for a.m. Smoking sensation reinforced.  PT/OT.  Discharge planning in 

progress for subacute rehab, possibly tomorrow, pending pulmonary clearance and 

final DC recommendations.

















The impression and plan of care has been dictated as directed.





:


I performed a history and examination of this patient,  discussed the same with 

the dictator.  I agree with the dictator's note ,documented as a scribe.  Any ad

ditional findings or plans will be noted.

## 2021-01-12 NOTE — XR
EXAMINATION TYPE: XR chest 1V

 

DATE OF EXAM: 1/12/2021

 

COMPARISON: 1/9/2021.

 

HISTORY: Shortness of breath.

 

TECHNIQUE: Single frontal view of the chest is obtained.

 

FINDINGS:  There is slight increase of bibasilar opacities, mild to moderate on the left and minimal 
on the right. There is probable trace left pleural effusion. No pneumothorax seen.  The cardiac silho
uette size is within normal limits.   The osseous structures are intact.

 

IMPRESSION:  Increased bibasilar opacities.
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
## 2021-01-12 NOTE — CT
EXAMINATION TYPE: CT brain wo con for TPA

 

DATE OF EXAM: 1/12/2021

 

COMPARISON: 9/21/2019

 

HISTORY: Weakness

 

CT DLP: Code stroke mGycm

Automated exposure control for dose reduction was used.

 

There is cerebral cortical atrophy. There is patchy hypodensity in the periventricular white matter. 
There is no mass effect nor midline shift. There is no sign of intracranial hemorrhage. There is no p
neumatization of the mastoid sinuses. The calvarium is intact.

 

IMPRESSION:

Cerebral atrophy and chronic small vessel ischemia. No acute intracranial abnormality. No change.
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
## 2021-01-13 LAB
ALBUMIN SERPL-MCNC: 2.7 G/DL (ref 3.5–5)
ALP SERPL-CCNC: 145 U/L (ref 38–126)
ALT SERPL-CCNC: 78 U/L (ref 4–49)
ANION GAP SERPL CALC-SCNC: 1 MMOL/L
ANION GAP SERPL CALC-SCNC: 2 MMOL/L
APTT BLD: 21 SEC (ref 22–30)
AST SERPL-CCNC: 113 U/L (ref 17–59)
BASOPHILS # BLD AUTO: 0 K/UL (ref 0–0.2)
BASOPHILS # BLD AUTO: 0 K/UL (ref 0–0.2)
BASOPHILS NFR BLD AUTO: 0 %
BASOPHILS NFR BLD AUTO: 0 %
BUN SERPL-SCNC: 11 MG/DL (ref 9–20)
BUN SERPL-SCNC: 14 MG/DL (ref 9–20)
CALCIUM SPEC-MCNC: 8.1 MG/DL (ref 8.4–10.2)
CALCIUM SPEC-MCNC: 8.1 MG/DL (ref 8.4–10.2)
CHLORIDE SERPL-SCNC: 105 MMOL/L (ref 98–107)
CHLORIDE SERPL-SCNC: 108 MMOL/L (ref 98–107)
CO2 SERPL-SCNC: 30 MMOL/L (ref 22–30)
CO2 SERPL-SCNC: 30 MMOL/L (ref 22–30)
EOSINOPHIL # BLD AUTO: 0.1 K/UL (ref 0–0.7)
EOSINOPHIL # BLD AUTO: 0.2 K/UL (ref 0–0.7)
EOSINOPHIL NFR BLD AUTO: 2 %
EOSINOPHIL NFR BLD AUTO: 3 %
ERYTHROCYTE [DISTWIDTH] IN BLOOD BY AUTOMATED COUNT: 2.95 M/UL (ref 4.3–5.9)
ERYTHROCYTE [DISTWIDTH] IN BLOOD BY AUTOMATED COUNT: 3.05 M/UL (ref 4.3–5.9)
ERYTHROCYTE [DISTWIDTH] IN BLOOD: 14.3 % (ref 11.5–15.5)
ERYTHROCYTE [DISTWIDTH] IN BLOOD: 14.4 % (ref 11.5–15.5)
GLUCOSE BLD-MCNC: 128 MG/DL (ref 75–99)
GLUCOSE BLD-MCNC: 232 MG/DL (ref 75–99)
GLUCOSE BLD-MCNC: 267 MG/DL (ref 75–99)
GLUCOSE BLD-MCNC: 274 MG/DL (ref 75–99)
GLUCOSE SERPL-MCNC: 133 MG/DL (ref 74–99)
GLUCOSE SERPL-MCNC: 173 MG/DL (ref 74–99)
HCT VFR BLD AUTO: 26.8 % (ref 39–53)
HCT VFR BLD AUTO: 27.9 % (ref 39–53)
HGB BLD-MCNC: 8.8 GM/DL (ref 13–17.5)
HGB BLD-MCNC: 9.2 GM/DL (ref 13–17.5)
INR PPP: 0.9 (ref ?–1.2)
LYMPHOCYTES # SPEC AUTO: 1.3 K/UL (ref 1–4.8)
LYMPHOCYTES # SPEC AUTO: 1.6 K/UL (ref 1–4.8)
LYMPHOCYTES NFR SPEC AUTO: 17 %
LYMPHOCYTES NFR SPEC AUTO: 21 %
MCH RBC QN AUTO: 30 PG (ref 25–35)
MCH RBC QN AUTO: 30.2 PG (ref 25–35)
MCHC RBC AUTO-ENTMCNC: 33 G/DL (ref 31–37)
MCHC RBC AUTO-ENTMCNC: 33 G/DL (ref 31–37)
MCV RBC AUTO: 90.9 FL (ref 80–100)
MCV RBC AUTO: 91.5 FL (ref 80–100)
MONOCYTES # BLD AUTO: 0.5 K/UL (ref 0–1)
MONOCYTES # BLD AUTO: 0.6 K/UL (ref 0–1)
MONOCYTES NFR BLD AUTO: 7 %
MONOCYTES NFR BLD AUTO: 7 %
NEUTROPHILS # BLD AUTO: 5 K/UL (ref 1.3–7.7)
NEUTROPHILS # BLD AUTO: 5.5 K/UL (ref 1.3–7.7)
NEUTROPHILS NFR BLD AUTO: 66 %
NEUTROPHILS NFR BLD AUTO: 71 %
PLATELET # BLD AUTO: 294 K/UL (ref 150–450)
PLATELET # BLD AUTO: 311 K/UL (ref 150–450)
POTASSIUM SERPL-SCNC: 3.6 MMOL/L (ref 3.5–5.1)
POTASSIUM SERPL-SCNC: 3.7 MMOL/L (ref 3.5–5.1)
PROT SERPL-MCNC: 5.4 G/DL (ref 6.3–8.2)
PT BLD: 10.1 SEC (ref 9–12)
SODIUM SERPL-SCNC: 137 MMOL/L (ref 137–145)
SODIUM SERPL-SCNC: 139 MMOL/L (ref 137–145)
WBC # BLD AUTO: 7.5 K/UL (ref 3.8–10.6)
WBC # BLD AUTO: 7.8 K/UL (ref 3.8–10.6)

## 2021-01-13 RX ADMIN — IPRATROPIUM BROMIDE AND ALBUTEROL SULFATE SCH ML: .5; 3 SOLUTION RESPIRATORY (INHALATION) at 09:05

## 2021-01-13 RX ADMIN — IPRATROPIUM BROMIDE AND ALBUTEROL SULFATE SCH ML: .5; 3 SOLUTION RESPIRATORY (INHALATION) at 20:45

## 2021-01-13 RX ADMIN — CEFAZOLIN SCH MLS/HR: 330 INJECTION, POWDER, FOR SOLUTION INTRAMUSCULAR; INTRAVENOUS at 01:12

## 2021-01-13 RX ADMIN — GABAPENTIN SCH MG: 100 CAPSULE ORAL at 10:40

## 2021-01-13 RX ADMIN — PIPERACILLIN AND TAZOBACTAM SCH MLS/HR: 3; .375 INJECTION, POWDER, FOR SOLUTION INTRAVENOUS at 10:39

## 2021-01-13 RX ADMIN — NICOTINE SCH PATCH: 14 PATCH, EXTENDED RELEASE TRANSDERMAL at 10:41

## 2021-01-13 RX ADMIN — INSULIN ASPART SCH UNIT: 100 INJECTION, SOLUTION INTRAVENOUS; SUBCUTANEOUS at 12:42

## 2021-01-13 RX ADMIN — SODIUM CHLORIDE SCH MLS/HR: 9 INJECTION, SOLUTION INTRAVENOUS at 01:12

## 2021-01-13 RX ADMIN — ATORVASTATIN CALCIUM SCH MG: 20 TABLET, FILM COATED ORAL at 10:40

## 2021-01-13 RX ADMIN — SODIUM CHLORIDE SCH MLS/HR: 9 INJECTION, SOLUTION INTRAVENOUS at 15:07

## 2021-01-13 RX ADMIN — CLOPIDOGREL BISULFATE SCH MG: 75 TABLET ORAL at 10:41

## 2021-01-13 RX ADMIN — CEFAZOLIN SCH: 330 INJECTION, POWDER, FOR SOLUTION INTRAMUSCULAR; INTRAVENOUS at 21:22

## 2021-01-13 RX ADMIN — HYDROCODONE BITARTRATE AND ACETAMINOPHEN PRN EACH: 10; 325 TABLET ORAL at 21:26

## 2021-01-13 RX ADMIN — BUDESONIDE AND FORMOTEROL FUMARATE DIHYDRATE SCH PUFF: 160; 4.5 AEROSOL RESPIRATORY (INHALATION) at 20:45

## 2021-01-13 RX ADMIN — INSULIN ASPART SCH: 100 INJECTION, SOLUTION INTRAVENOUS; SUBCUTANEOUS at 06:22

## 2021-01-13 RX ADMIN — CEFAZOLIN SCH MLS/HR: 330 INJECTION, POWDER, FOR SOLUTION INTRAMUSCULAR; INTRAVENOUS at 12:42

## 2021-01-13 RX ADMIN — TIOTROPIUM BROMIDE INHALATION SPRAY SCH: 3.12 SPRAY, METERED RESPIRATORY (INHALATION) at 09:06

## 2021-01-13 RX ADMIN — ACETAMINOPHEN SCH ML: 160 SOLUTION ORAL at 21:22

## 2021-01-13 RX ADMIN — IPRATROPIUM BROMIDE AND ALBUTEROL SULFATE SCH ML: .5; 3 SOLUTION RESPIRATORY (INHALATION) at 11:28

## 2021-01-13 RX ADMIN — PIPERACILLIN AND TAZOBACTAM SCH MLS/HR: 3; .375 INJECTION, POWDER, FOR SOLUTION INTRAVENOUS at 22:57

## 2021-01-13 RX ADMIN — PANTOPRAZOLE SODIUM SCH MG: 40 INJECTION, POWDER, FOR SOLUTION INTRAVENOUS at 10:41

## 2021-01-13 RX ADMIN — INSULIN ASPART SCH UNIT: 100 INJECTION, SOLUTION INTRAVENOUS; SUBCUTANEOUS at 17:45

## 2021-01-13 RX ADMIN — IPRATROPIUM BROMIDE AND ALBUTEROL SULFATE SCH ML: .5; 3 SOLUTION RESPIRATORY (INHALATION) at 16:45

## 2021-01-13 RX ADMIN — BUDESONIDE AND FORMOTEROL FUMARATE DIHYDRATE SCH PUFF: 160; 4.5 AEROSOL RESPIRATORY (INHALATION) at 09:05

## 2021-01-13 RX ADMIN — GABAPENTIN SCH MG: 100 CAPSULE ORAL at 21:22

## 2021-01-13 RX ADMIN — ACETAMINOPHEN SCH ML: 160 SOLUTION ORAL at 17:45

## 2021-01-13 RX ADMIN — PIPERACILLIN AND TAZOBACTAM SCH MLS/HR: 3; .375 INJECTION, POWDER, FOR SOLUTION INTRAVENOUS at 17:46

## 2021-01-13 RX ADMIN — ACETAMINOPHEN SCH ML: 160 SOLUTION ORAL at 10:41

## 2021-01-13 RX ADMIN — HYDROCODONE BITARTRATE AND ACETAMINOPHEN PRN EACH: 10; 325 TABLET ORAL at 10:47

## 2021-01-13 RX ADMIN — LEVOTHYROXINE SODIUM SCH MCG: 50 TABLET ORAL at 06:24

## 2021-01-13 RX ADMIN — INSULIN ASPART SCH UNIT: 100 INJECTION, SOLUTION INTRAVENOUS; SUBCUTANEOUS at 21:22
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
## 2021-01-13 NOTE — P.PN
Subjective


Progress Note Date: 01/13/21


This is a 75-year-old gentleman admitted with acute COPD exacerbation, 

interstitial lung disease, healthcare acquired pneumonia, acute hypoxic 

respiratory failure and multiple other medical issues.  Maintained on nebulized 

bronchodilators, Zosyn, vancomycin.  Remains off of metformin secondary to her 

renal failure, renal function improving, creatinine down to 1.01.  Blood sugars 

elevated in the 200s Sodium improving with IV fluid hydration, up to 136.  

Potassium 3.9, normal magnesium.  AST/ALT mildly trending up, T bili within 

normal limits.  Maintaining O2 sats in the high 90s on 3 L nasal cannula.  

Afebrile.








01/12/2021  breathing improving, oxygen weaned down to 2 L nasal cannula, 

maintaining O2 sats in the 90s.  Maintained on Zosyn, vancomycin.  Creatinine 

down to 0.9.Occasional cough.Complains of mouth discomfort, mostly right lower 

jaw, secondary to his dentures-state wife will be bringing him denture cream.  

Consuming  50% with no nausea vomiting or diarrhea.  No abdominal pain.  Blood 

sugars controlled.  Afebrile, normal WBC.











01/13/21 Yesterday confusion worsened accompanied by left-sided weakness, 

questionable dysarthria, code stroke called, neurology workup initiated with ne

urology consulted.  Brain CT reported no acute intracranial abnormality.  CTA 

reported thrombosis of the entire  left internal carotid artery up to the [place]

[place], no evidence of hemodynamic stenosis of the right internal carotid 

artery.  Slept well.  This morning feels much better. Speech mumbled as 

previously, left-sided weakness resolved.  Continues on vancomycin and Zosyn. 

Sitting up in chair with congestive cough, maintaining O2 sats in the high 90s 

on 3 L nasal cannula. Afebrile. 





Objective





- Vital Signs


Vital signs: 


                                   Vital Signs











Temp  98.4 F   01/13/21 11:03


 


Pulse  69   01/13/21 11:38


 


Resp  18   01/13/21 11:03


 


BP  100/55   01/13/21 11:03


 


Pulse Ox  98   01/13/21 11:03








                                 Intake & Output











 01/12/21 01/13/21 01/13/21





 18:59 06:59 18:59


 


Intake Total 1190 240 472


 


Output Total 1450 550 


 


Balance -260 -310 472


 


Weight  83.5 kg 


 


Intake:   


 


  Intake, IV Titration 350  





  Amount   


 


    Piperacillin-Tazobactam 3 100  





    .375 gm In Sodium   





    Chloride 0.9% 100 ml @ 25   





    mls/hr IVPB Q8HR [facility] Rx#   





    :[id number]   


 


    Vancomycin 1,500 mg In 250  





    Sodium Chloride 0.9% 250   





    ml @ 125 mls/hr IVPB Q12H   





    [facility] Rx#:[id number]   


 


  Oral 840 240 472


 


Output:   


 


  Urine 1450 550 


 


Other:   


 


  Voiding Method Diaper Urinal Urinal





 Incontinent Diaper Diaper





  Incontinent Incontinent


 


  # Voids 1 1 1


 


  # Bowel Movements 0  1














- Exam


PHYSICAL EXAMINATION: 





GENERAL: Sitting up in bed, alert and oriented x3, no acute distress, mumbles. 


HEENT: Pupils are round and equally reacting to light. EOMI. No scleral icterus.

No conjunctival pallor. Normocephalic, atraumatic. No pharyngeal erythema. No 

thyromegaly. 


CARDIOVASCULAR: S1 and S2 present. No murmurs, rubs, or gallops. 


PULMONARY: Chest is clear to auscultation, scattered rhonchi


ABDOMEN: Soft, nontender, nondistended, normoactive bowel sounds. No palpable 

organomegaly. 


MUSCULOSKELETAL: No joint swelling or deformity.


EXTREMITIES: No cyanosis, clubbing, or pedal edema. 


NEUROLOGICAL: Gross neurological examination did not reveal any focal deficits. 


SKIN: Warm and dry, No rashes. 











- Labs


CBC & Chem 7: 


                                 01/13/21 07:22





                                 01/13/21 07:22


Labs: 


                  Abnormal Lab Results - Last 24 Hours (Table)











  01/12/21 01/12/21 01/12/21 Range/Units





  16:42 20:46 23:17 


 


RBC     (4.30-5.90)  m/uL


 


Hgb     (13.0-17.5)  gm/dL


 


Hct     (39.0-53.0)  %


 


APTT     (22.0-30.0)  sec


 


Chloride     ()  mmol/L


 


Glucose     (74-99)  mg/dL


 


POC Glucose (mg/dL)  262 H  254 H  200 H  (75-99)  mg/dL


 


Calcium     (8.4-10.2)  mg/dL


 


AST     (17-59)  U/L


 


ALT     (4-49)  U/L


 


Alkaline Phosphatase     ()  U/L


 


Total Protein     (6.3-8.2)  g/dL


 


Albumin     (3.5-5.0)  g/dL














  01/12/21 01/12/21 01/12/21 Range/Units





  23:56 23:56 23:56 


 


RBC  3.05 L    (4.30-5.90)  m/uL


 


Hgb  9.2 L    (13.0-17.5)  gm/dL


 


Hct  27.9 L    (39.0-53.0)  %


 


APTT   21.0 L   (22.0-30.0)  sec


 


Chloride     ()  mmol/L


 


Glucose    173 H  (74-99)  mg/dL


 


POC Glucose (mg/dL)     (75-99)  mg/dL


 


Calcium    8.1 L  (8.4-10.2)  mg/dL


 


AST    113 H  (17-59)  U/L


 


ALT    78 H  (4-49)  U/L


 


Alkaline Phosphatase    145 H  ()  U/L


 


Total Protein    5.4 L  (6.3-8.2)  g/dL


 


Albumin    2.7 L  (3.5-5.0)  g/dL














  01/13/21 01/13/21 01/13/21 Range/Units





  06:13 07:22 07:22 


 


RBC    2.95 L  (4.30-5.90)  m/uL


 


Hgb    8.8 L  (13.0-17.5)  gm/dL


 


Hct    26.8 L  (39.0-53.0)  %


 


APTT     (22.0-30.0)  sec


 


Chloride   108 H   ()  mmol/L


 


Glucose   133 H   (74-99)  mg/dL


 


POC Glucose (mg/dL)  128 H    (75-99)  mg/dL


 


Calcium   8.1 L   (8.4-10.2)  mg/dL


 


AST     (17-59)  U/L


 


ALT     (4-49)  U/L


 


Alkaline Phosphatase     ()  U/L


 


Total Protein     (6.3-8.2)  g/dL


 


Albumin     (3.5-5.0)  g/dL














  01/13/21 Range/Units





  11:51 


 


RBC   (4.30-5.90)  m/uL


 


Hgb   (13.0-17.5)  gm/dL


 


Hct   (39.0-53.0)  %


 


APTT   (22.0-30.0)  sec


 


Chloride   ()  mmol/L


 


Glucose   (74-99)  mg/dL


 


POC Glucose (mg/dL)  274 H  (75-99)  mg/dL


 


Calcium   (8.4-10.2)  mg/dL


 


AST   (17-59)  U/L


 


ALT   (4-49)  U/L


 


Alkaline Phosphatase   ()  U/L


 


Total Protein   (6.3-8.2)  g/dL


 


Albumin   (3.5-5.0)  g/dL








                      Microbiology - Last 24 Hours (Table)











 01/09/21 14:59 Blood Culture - Preliminary





 Blood    No Growth after 72 hours














Assessment and Plan


Assessment: 


-Acute hypoxic respiratory failure secondary to severe healthcare-acquired 

pneumonia, acute COPD exacerbation


-Acute renal failure prerenal azotemia, improving with IV fluid hydration


-Dysarthria, appears unchanged from admission, possible CVA, neurology consulted


-Left carotid stenosis


-Hypokalemia and hypomagnesemia these electrolytes will be replaced and the 

hypokalemia secondary to IV fluids


-Mildly elevated troponin secondary to hypoxemia.  Troponin trended downwards


-Hyponatremia hypovolemic hyponatremia, improved with IV fluid hydration


-Interstitial lung disease


-Type 2 diabetes mellitus


-hypothyroidism


-Diabetes mellitus


-Gastroesophageal reflux disease


-Hyperlipidemia


-History of GI bleed














Plan: Continue on current medication regime, monitoring and symptomatically 

treatment.  Neurology consult in place, continues on Plavix and Lipitor. 

Continue IV antibiotics, nebulized bronchodilators. Smoking sensation 

reinforced.  PT/OT.  Discharge planning in progress for subacute rehab.

















The impression and plan of care has been dictated as directed.





:


I performed a history and examination of this patient,  discussed the same with 

the dictator.  I agree with the dictator's note ,documented as a scribe.  Any 

additional findings or plans will be noted.

## 2021-01-13 NOTE — CT
EXAMINATION TYPE: CT angio head neck

 

DATE OF EXAM: 1/12/2021

 

COMPARISON: None

 

HISTORY: Code stroke

 

CT DLP: 1658.7 mGycm

Automated exposure control for dose reduction was used.

 

CONTRAST: 

Performed with IV Contrast, patient injected with 65 mL of Isovue 370.

 

There are 3-D post processed images. Images were obtained from the aortic arch to the vertex of the b
rain with IV contrast.

 

FINDINGS:

There is normal branching pattern of the great vessels on the aortic arch. There is bilateral arteria
l flow in the subclavian arteries. There is arterial flow in the common carotid arteries. There is co
mplete occlusion of the left internal carotid artery. There is some plaque formation at the left anthony
tid artery bifurcation. No significant stenosis seen of the left external carotid artery.

 

There is some tortuosity of the right internal carotid artery. There is some plaque formation at the 
right carotid artery bifurcation and lumen narrowing less than 20%.

 

There is arterial flow in both vertebral arteries. Left vertebral artery is much larger than the righ
t. There is arterial flow in the vertebrobasilar artery system. Basilar artery appears to fill mostly
 from the left side.

 

There is arterial flow in the anterior middle and posterior cerebral artery. The left middle cerebral
 artery and left anterior cerebral artery appear to fill through the posterior communicating artery a
nd the anterior communicating artery. I see no evidence of intracranial aneurysm or neovascularity. T
here is no mass effect. There is normal contrast opacification of the venous sinuses. I see no eviden
ce of intracranial arterial stenosis.

 

IMPRESSION:

There is thrombosis of the entire left internal carotid artery up to the [place]. There is m
inimal plaque formation and no evidence of hemodynamic stenosis of the right internal carotid artery.
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
## 2021-01-14 VITALS — RESPIRATION RATE: 18 BRPM

## 2021-01-14 VITALS — HEART RATE: 68 BPM

## 2021-01-14 VITALS — TEMPERATURE: 98.6 F | DIASTOLIC BLOOD PRESSURE: 65 MMHG | SYSTOLIC BLOOD PRESSURE: 134 MMHG

## 2021-01-14 LAB
ANION GAP SERPL CALC-SCNC: 1 MMOL/L
BUN SERPL-SCNC: 13 MG/DL (ref 9–20)
CALCIUM SPEC-MCNC: 8.1 MG/DL (ref 8.4–10.2)
CHLORIDE SERPL-SCNC: 112 MMOL/L (ref 98–107)
CHOLEST SERPL-MCNC: 100 MG/DL (ref ?–200)
CO2 SERPL-SCNC: 30 MMOL/L (ref 22–30)
GLUCOSE BLD-MCNC: 139 MG/DL (ref 75–99)
GLUCOSE BLD-MCNC: 204 MG/DL (ref 75–99)
GLUCOSE SERPL-MCNC: 132 MG/DL (ref 74–99)
HBA1C MFR BLD: 9.3 % (ref 4–6)
HDLC SERPL-MCNC: 34 MG/DL (ref 40–60)
LDLC SERPL CALC-MCNC: 43 MG/DL (ref 0–99)
POTASSIUM SERPL-SCNC: 3.9 MMOL/L (ref 3.5–5.1)
SODIUM SERPL-SCNC: 143 MMOL/L (ref 137–145)
TRIGL SERPL-MCNC: 114 MG/DL (ref ?–150)
VIT B12 SERPL-MCNC: 194 PG/ML (ref 200–944)

## 2021-01-14 RX ADMIN — ACETAMINOPHEN SCH ML: 160 SOLUTION ORAL at 11:03

## 2021-01-14 RX ADMIN — INSULIN ASPART SCH UNIT: 100 INJECTION, SOLUTION INTRAVENOUS; SUBCUTANEOUS at 12:39

## 2021-01-14 RX ADMIN — PIPERACILLIN AND TAZOBACTAM SCH MLS/HR: 3; .375 INJECTION, POWDER, FOR SOLUTION INTRAVENOUS at 11:01

## 2021-01-14 RX ADMIN — IPRATROPIUM BROMIDE AND ALBUTEROL SULFATE SCH ML: .5; 3 SOLUTION RESPIRATORY (INHALATION) at 09:25

## 2021-01-14 RX ADMIN — HYDROCODONE BITARTRATE AND ACETAMINOPHEN PRN EACH: 10; 325 TABLET ORAL at 06:24

## 2021-01-14 RX ADMIN — GABAPENTIN SCH MG: 100 CAPSULE ORAL at 11:02

## 2021-01-14 RX ADMIN — TIOTROPIUM BROMIDE INHALATION SPRAY SCH PUFF: 3.12 SPRAY, METERED RESPIRATORY (INHALATION) at 09:26

## 2021-01-14 RX ADMIN — PANTOPRAZOLE SODIUM SCH MG: 40 INJECTION, POWDER, FOR SOLUTION INTRAVENOUS at 11:03

## 2021-01-14 RX ADMIN — SODIUM CHLORIDE SCH MLS/HR: 9 INJECTION, SOLUTION INTRAVENOUS at 01:04

## 2021-01-14 RX ADMIN — NICOTINE SCH PATCH: 14 PATCH, EXTENDED RELEASE TRANSDERMAL at 11:02

## 2021-01-14 RX ADMIN — BUDESONIDE AND FORMOTEROL FUMARATE DIHYDRATE SCH PUFF: 160; 4.5 AEROSOL RESPIRATORY (INHALATION) at 09:25

## 2021-01-14 RX ADMIN — CLOPIDOGREL BISULFATE SCH MG: 75 TABLET ORAL at 11:02

## 2021-01-14 RX ADMIN — IPRATROPIUM BROMIDE AND ALBUTEROL SULFATE SCH ML: .5; 3 SOLUTION RESPIRATORY (INHALATION) at 13:10

## 2021-01-14 RX ADMIN — ATORVASTATIN CALCIUM SCH MG: 20 TABLET, FILM COATED ORAL at 11:02

## 2021-01-14 RX ADMIN — LEVOTHYROXINE SODIUM SCH MCG: 50 TABLET ORAL at 06:24

## 2021-01-14 RX ADMIN — INSULIN ASPART SCH UNIT: 100 INJECTION, SOLUTION INTRAVENOUS; SUBCUTANEOUS at 06:24

## 2021-01-14 RX ADMIN — CEFAZOLIN SCH: 330 INJECTION, POWDER, FOR SOLUTION INTRAMUSCULAR; INTRAVENOUS at 06:26
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
## 2021-01-14 NOTE — ECHOF
Referral Reason:TIA/CVA



MEASUREMENTS

--------

HEIGHT: 180.3 cm

WEIGHT: 84.4 kg

BP: 109/62

RVIDd:   2.8 cm     (< 3.3)

IVSd:   0.9 cm     (0.6 - 1.1)

LVIDd:   4.3 cm     (3.9 - 5.3)

LVPWd:   1.1 cm     (0.6 - 1.1)

IVSs:   1.5 cm

LVIDs:   2.6 cm

LVPWs:   2.0 cm

Ao Diam:   3.3 cm     (2.0 - 3.7)

AV Cusp:   1.9 cm     (1.5 - 2.6)

LA Diam:   3.4 cm     (2.7 - 3.8)

MV EXCURSION:   15.618 mm     (> 18.000)

MV EF SLOPE:   98 mm/s     (70 - 150)

EPSS:   0.6 cm

MV E Isai:   0.98 m/s

MV DecT:   195 ms

MV A Isai:   1.00 m/s

MV E/A Ratio:   0.98 

RAP:   5.00 mmHg

RVSP:   47.50 mmHg







FINDINGS

--------

This was a technically adequate study.

The left ventricular size is normal.   Left ventricular wall thickness is normal.   Overall left vent
ricular systolic function is normal with, an EF between 55 - 60 %.

The right ventricle is normal in size.

The left atrial size is normal.

The right atrial size is normal.

Contrast study was performed with 1 iv injection of 8 ccs of agitated normal saline at rest.

Bubble study to rule out shunt. No shunt seen.

The aortic valve is trileaflet and appears structurally normal.

The mitral valve is normal.   There is trace mitral regurgitation.

The tricuspid valve appears structurally normal.   Mild tricuspid regurgitation present.   There is m
ild pulmonary hypertension.   The right ventricular systolic pressure, as measured by Doppler, is 47.
50mmHg.

There is no pulmonic regurgitation present.

The aortic root size is normal.

IVC Not well visulized.

There is no pericardial effusion.



CONCLUSIONS

--------

1. The left ventricular size is normal.

2. Left ventricular wall thickness is normal.

3. Overall left ventricular systolic function is normal with, an EF between 55 - 60 %.

4. Contrast study was performed with 1 iv injection of 8 ccs of agitated normal saline at rest.

5. Bubble study to rule out shunt. No shunt seen.

6. There is trace mitral regurgitation.

7. Mild tricuspid regurgitation present.

8. There is mild pulmonary hypertension.

9. The right ventricular systolic pressure, as measured by Doppler, is 47.50mmHg.

10. There is no pericardial effusion.





SONOGRAPHER: Susie Kaba RDCS

## 2021-01-14 NOTE — CDI
Documentation Clarification Form





Date:  1/14/2021  11:45 AM

CDS: Kirstin LermaWENDI, CCDS

Phone: [phone]

Admit Date:  1/9/2021  17:39

Account #: [id number]

Patient Name: Maurice Jo

Discharge Date: 

   

ATTENTION: The Clinical Documentation Specialists (CDI) and [facility] Coding Staff 
appreciate your assistance in clarifying documentation. Please respond to the 
clarification below the line at the bottom and electronically sign. The CDI & 
[facility] Coding staff will review the response and follow-up if needed. Please note: 
Queries are made part of the Legal Health Record. If you have any questions, 
please contact the author of this message via ITS.



Dear Dr. Sophia Narayanan:



Anemia is documented in the 1/13 Neurology Consult without further specificity.



History/Risk Factors: COPD, IDDM II, Hypertension, GERD, Hypothyroid, Lower GI 
bleed. Smoker.



Clinical indicators: Presented to the ED on 1/9 with SOB via EMS. Admit with 
COPD, Healthcare Acquired Pneumonia & NANY. the patient was recently admitted 
with pneumonia. 

Home meds: Synthroid, INH Symbicort, Lipitor, INH Spiriva, Seroquel, Habitrol, 
INH Duoneb, Insulin sq, Neurontin, Plavix, INH Albuterol.



Hemoglobin 1/9: 12.1.  1/10: 10.9*.  1/12: 9.5* - 9.2*.  1.13: 8.8*

Hematocrit 1/9: 35.0*.  1/10: 32.4*.  1/12: 28.3* - 27.9*.  1/13: 26.8*



Treatment: IV fluid bolus 1,000 mls @ 999 mls/hr q1H, IV Zosyn, IV Kcl, IV 
Vancomycin, IV Heparin, INH Ventolin, INH Spiriva, INH Symbicort, INH Duoneb, 
Insulin sq, IV MagSulfate, IV Haldol, O2 3-4Lnc.



In order to capture the severity of condition, please clarify the type of anemia
and etiology if known:.



    Acute blood loss anemia

    Acute on chronic blood loss anemia

    Chronic blood loss anemia

    Iron deficiency anemia

    Drug induced anemia

    Nutritional anemia

    Anemia of Chronic Disease, please specify: _________________

    Unable to determine

    Other, please specify ___________





(Last Form Revision: March 2020)



Patient has B12 deficiency noted on the test which may be the cause.  For any 
additional causes please check with patient's IM/PCP

_______________________________________________________________________

MTDD

## 2021-01-14 NOTE — P.DS
Providers


Date of admission: 


01/09/21 17:39





Expected date of discharge: 01/14/21


Attending physician: 


Fidencio Varner MD





Consults: 





                                        





01/09/21 17:39


Consult Physician Urgent 


   Consulting Provider: Moisés Ruib


   Consult Reason/Comments: acute hypoxic resp failure, aecopd, HCAP


   Do you want consulting provider notified?: Yes





01/13/21 00:27


Consult Physician Urgent 


   Consulting Provider: Sophia Narayanan


   Consult Reason/Comments: Code Stroke


   Do you want consulting provider notified?: Yes, Notify in am











Primary care physician: 


Noemi Kim





[facility] Course: 


Final Diagnoses:


-Acute hypoxic respiratory failure secondary to severe healthcare-acquired 

pneumonia, acute COPD exacerbation


-Acute renal failure prerenal azotemia, improving with IV fluid hydration


-Possible TIA, symptoms resolved ,neurology following


-Delirium, Acute metabolic encephalopathy, multifactorial, secondary to all the 

above, sleep deprivation from being in hospital


-Left carotid stenosis, outpatient monitoring recommended


-Hypokalemia 


-Hypomagnesemia 


-Mildly elevated troponin secondary to hypoxemia.  Troponin trended downwards


-Hyponatremia hypovolemic, improved with IV fluid hydration


-Interstitial lung disease


-Type 2 diabetes mellitus


-hypothyroidism


-Diabetes mellitus


-Gastroesophageal reflux disease


-Hyperlipidemia


-History of GI bleed











Hospital course:This is a 75-year-old gentleman admitted with acute COPD 

exacerbation, interstitial lung disease, healthcare acquired pneumonia, acute 

hypoxic respiratory failure and multiple other medical issues.  Maintained on 

nebulized bronchodilators, Zosyn, vancomycin.  Remains off of metformin 

secondary to her renal failure, renal function improving, creatinine down to 

1.01.  Blood sugars elevated in the 200s Sodium improving with IV fluid 

hydration, up to 136.  Potassium 3.9, normal magnesium.  AST/ALT mildly trending

up, T bili within normal limits.  Maintaining O2 sats in the high 90s on 3 L 

nasal cannula.  Afebrile.








01/12/2021  breathing improving, oxygen weaned down to 2 L nasal cannula, 

maintaining O2 sats in the 90s.  Maintained on Zosyn, vancomycin.  Creatinine 

down to 0.9.Occasional cough.Complains of mouth discomfort, mostly right lower 

jaw, secondary to his dentures-state wife will be bringing him denture cream.  

Consuming  50% with no nausea vomiting or diarrhea.  No abdominal pain.  Blood 

sugars controlled.  Afebrile, normal WBC.











01/13/21 Yesterday confusion worsened accompanied by left-sided weakness, 

questionable dysarthria, code stroke called, neurology workup initiated with 

neurology consulted.  Brain CT reported no acute intracranial abnormality.  CTA 

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

of [place], no evidence of hemodynamic stenosis of the right internal carotid 

artery.  Slept well.  This morning feels much better. Speech mumbled as 

previously, left-sided weakness resolved.  Continues on vancomycin and Zosyn. 

Sitting up in chair with congestive cough, maintaining O2 sats in the high 90s 

on 3 L nasal cannula. Afebrile. 








Evaluated yesterday by a neurology, symptoms resolved; patient mumbles but no 

dysarthria, no aphasia.  A & O 3.  Maintained on Plavix for stroke prevention. 

Smoking cessation reinforced.  2-D echo with bubble study to rule out PFO 

pending.  Patient will be discharged today to subacute rehab in a stable 

condition with guarded prognosis pending pulmonary clearance, echo results.














The impression and plan of care has been dictated as directed.





:


I performed a history and examination of this patient,  discussed the same with 

the dictator.  I agree with the dictator's note ,documented as a scribe.  Any 

additional findings or plans will be noted.


Patient Condition at Discharge: Stable





Plan - Discharge Summary


Discharge Rx Participant: No


New Discharge Prescriptions: 


New


   Ipratropium-Albuterol Nebulize [Duoneb 0.5 mg-3 mg/3 ml Soln] 3 ml INHALATION

RT-QID  ml


   Ipratropium-Albuterol Nebulize [Duoneb 0.5 mg-3 mg/3 ml Soln] 3 ml INHALATION

Q4H PRN  ml


     PRN Reason: Shortness Of Breath Or Wheezing


   Nicotine 14Mg/24Hr Patch [Habitrol] 1 patch TRANSDERM DAILY  patch


   Mag Hydrox/Al Hydrox/Simeth [Maalox] 30 ml PO TID  ml


   Gabapentin [Neurontin] 200 mg PO BID #12 cap


   HYDROcodone/APAP 10-325MG [Norco ] 1 each PO Q6H PRN #12 tab


     PRN Reason: Pain


   Clopidogrel [Plavix] 75 mg PO DAILY  tab


   QUEtiapine [SEROquel] 50 mg PO HS  tab


   Tiotropium 2.5 Mcg/Puff [Spiriva Respimat 2.5 Mcg] 2 puff INHALATION RT-DAILY

 puff


   Albuterol Inhaler [Ventolin Hfa Inhaler] 2 puff INHALATION RT-QID PRN  puff


     PRN Reason: Shortness Of Breath Or Wheezing


   Lidocaine Viscous [Xylocaine Viscous 2%] 30 ml PO TID  ml


   INSULIN LISPRO (HumaLOG) [humaLOG] 0 unit SQ ACHS #1 vial





Continue


   metFORMIN HCL 1,000 mg PO BID


   Atorvastatin Calcium [Lipitor] 20 mg PO DAILY


   Levothyroxine Sodium [Synthroid] 50 mcg PO DAILY


   Budesonide-Formot 160-4.5 Mcg [Symbicort 160-4.5 Mcg Inhaler] 2 puff 

INHALATION RT-BID #1 puff





Discontinued


   HYDROcodone/APAP 10-325MG [Norco ] 1 tab PO QID PRN


     PRN Reason: Pain


   Gabapentin [Neurontin] 300 mg PO BID


Discharge Medication List





metFORMIN HCL 1,000 mg PO BID 11/23/16 [History]


Atorvastatin Calcium [Lipitor] 20 mg PO DAILY 12/19/20 [History]


Levothyroxine Sodium [Synthroid] 50 mcg PO DAILY 12/19/20 [History]


Budesonide-Formot 160-4.5 Mcg [Symbicort 160-4.5 Mcg Inhaler] 2 puff INHALATION 

RT-BID #1 puff 12/21/20 [Rx]


Albuterol Inhaler [Ventolin Hfa Inhaler] 2 puff INHALATION RT-QID PRN  puff 

01/14/21 [Rx]


Clopidogrel [Plavix] 75 mg PO DAILY  tab 01/14/21 [Rx]


Gabapentin [Neurontin] 200 mg PO BID #12 cap 01/14/21 [Rx]


HYDROcodone/APAP 10-325MG [[place] ] 1 each PO Q6H PRN #12 tab 01/14/21 [Rx]


INSULIN LISPRO (HumaLOG) [humaLOG] 0 unit SQ ACHS #1 vial 01/14/21 [Rx]


Ipratropium-Albuterol Nebulize [Duoneb 0.5 mg-3 mg/3 ml Soln] 3 ml INHALATION 

Q4H PRN  ml 01/14/21 [Rx]


Ipratropium-Albuterol Nebulize [Duoneb 0.5 mg-3 mg/3 ml Soln] 3 ml INHALATION 

RT-QID  ml 01/14/21 [Rx]


Lidocaine Viscous [Xylocaine Viscous 2%] 30 ml PO TID  ml 01/14/21 [Rx]


Mag Hydrox/Al Hydrox/Simeth [Maalox] 30 ml PO TID  ml 01/14/21 [Rx]


Nicotine 14Mg/24Hr Patch [Habitrol] 1 patch TRANSDERM DAILY  patch 01/14/21 [Rx]


QUEtiapine [SEROquel] 50 mg PO HS  tab 01/14/21 [Rx]


Tiotropium 2.5 Mcg/Puff [Spiriva Respimat 2.5 Mcg] 2 puff INHALATION RT-DAILY  

puff 01/14/21 [Rx]








Follow up Appointment(s)/Referral(s): 


Noemi Kim DO [Primary Care Provider] - 1 Week (after dc from ECF)


Activity/Diet/Wound Care/Special Instructions: 


Lien FERRIS,BMP in 3 days





3lnc


Discharge Disposition: HOME WITH HOME HEALTH SERVICES

## 2022-07-25 ENCOUNTER — HOSPITAL ENCOUNTER (EMERGENCY)
Dept: HOSPITAL 47 - EC | Age: 77
Discharge: HOME | End: 2022-07-25
Payer: MEDICARE

## 2022-07-25 VITALS — SYSTOLIC BLOOD PRESSURE: 139 MMHG | HEART RATE: 71 BPM | DIASTOLIC BLOOD PRESSURE: 71 MMHG

## 2022-07-25 VITALS — RESPIRATION RATE: 18 BRPM | TEMPERATURE: 98.1 F

## 2022-07-25 DIAGNOSIS — F17.200: ICD-10-CM

## 2022-07-25 DIAGNOSIS — J44.9: ICD-10-CM

## 2022-07-25 DIAGNOSIS — Z79.899: ICD-10-CM

## 2022-07-25 DIAGNOSIS — E07.9: ICD-10-CM

## 2022-07-25 DIAGNOSIS — I10: ICD-10-CM

## 2022-07-25 DIAGNOSIS — E11.9: ICD-10-CM
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
## 2022-07-25 DIAGNOSIS — T14.8XXA: Primary | ICD-10-CM

## 2022-07-25 PROCEDURE — 99281 EMR DPT VST MAYX REQ PHY/QHP: CPT
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
## 2022-08-17 ENCOUNTER — HOSPITAL ENCOUNTER (EMERGENCY)
Dept: HOSPITAL 47 - EC | Age: 77
Discharge: HOME | End: 2022-08-17
Payer: MEDICARE

## 2022-08-17 VITALS — RESPIRATION RATE: 20 BRPM | HEART RATE: 75 BPM

## 2022-08-17 VITALS — DIASTOLIC BLOOD PRESSURE: 75 MMHG | SYSTOLIC BLOOD PRESSURE: 128 MMHG

## 2022-08-17 VITALS — TEMPERATURE: 97.4 F

## 2022-08-17 DIAGNOSIS — E07.9: ICD-10-CM

## 2022-08-17 DIAGNOSIS — T14.8XXA: Primary | ICD-10-CM

## 2022-08-17 DIAGNOSIS — J44.9: ICD-10-CM

## 2022-08-17 DIAGNOSIS — F17.200: ICD-10-CM

## 2022-08-17 DIAGNOSIS — I10: ICD-10-CM

## 2022-08-17 DIAGNOSIS — L30.9: ICD-10-CM

## 2022-08-17 DIAGNOSIS — Z79.899: ICD-10-CM

## 2022-08-17 PROCEDURE — 99282 EMERGENCY DEPT VISIT SF MDM: CPT
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
